# Patient Record
Sex: MALE | Race: WHITE | Employment: OTHER | URBAN - METROPOLITAN AREA
[De-identification: names, ages, dates, MRNs, and addresses within clinical notes are randomized per-mention and may not be internally consistent; named-entity substitution may affect disease eponyms.]

---

## 2018-10-11 ENCOUNTER — HOSPITAL ENCOUNTER (OUTPATIENT)
Age: 83
Discharge: HOME | End: 2018-10-11
Payer: MEDICARE

## 2018-10-11 LAB
ALBUMIN SERPL-MCNC: 4.1 G/DL (ref 3.5–5.2)
ALP BLD-CCNC: 114 U/L (ref 40–129)
ALT SERPL-CCNC: 9 U/L (ref 0–40)
ANION GAP SERPL CALCULATED.3IONS-SCNC: 10 MMOL/L (ref 7–16)
AST SERPL-CCNC: 21 U/L (ref 0–39)
BILIRUB SERPL-MCNC: 0.6 MG/DL (ref 0–1.2)
BUN BLDV-MCNC: 18 MG/DL (ref 8–23)
CALCIUM SERPL-MCNC: 9.1 MG/DL (ref 8.6–10.2)
CHLORIDE BLD-SCNC: 104 MMOL/L (ref 98–107)
CHOLESTEROL, FASTING: 141 MG/DL (ref 0–199)
CO2: 28 MMOL/L (ref 22–29)
CREAT SERPL-MCNC: 1.4 MG/DL (ref 0.7–1.2)
GFR AFRICAN AMERICAN: 59
GFR NON-AFRICAN AMERICAN: 49 ML/MIN/1.73
GLUCOSE FASTING: 93 MG/DL (ref 74–109)
HCT VFR BLD CALC: 38.3 % (ref 37–54)
HDLC SERPL-MCNC: 52 MG/DL
HEMOGLOBIN: 12.7 G/DL (ref 12.5–16.5)
LDL CHOLESTEROL CALCULATED: 73 MG/DL (ref 0–99)
MCH RBC QN AUTO: 30.7 PG (ref 26–35)
MCHC RBC AUTO-ENTMCNC: 33.2 % (ref 32–34.5)
MCV RBC AUTO: 92.5 FL (ref 80–99.9)
PDW BLD-RTO: 13.8 FL (ref 11.5–15)
PLATELET # BLD: 154 E9/L (ref 130–450)
PMV BLD AUTO: 13 FL (ref 7–12)
POTASSIUM SERPL-SCNC: 4.5 MMOL/L (ref 3.5–5)
PROSTATE SPECIFIC ANTIGEN: 1.36 NG/ML (ref 0–4)
RBC # BLD: 4.14 E12/L (ref 3.8–5.8)
SODIUM BLD-SCNC: 142 MMOL/L (ref 132–146)
TOTAL CK: 58 U/L (ref 20–200)
TOTAL PROTEIN: 7.1 G/DL (ref 6.4–8.3)
TRIGLYCERIDE, FASTING: 81 MG/DL (ref 0–149)
TSH SERPL DL<=0.05 MIU/L-ACNC: 1.3 UIU/ML (ref 0.27–4.2)
VITAMIN D 25-HYDROXY: 29 NG/ML (ref 30–100)
VLDLC SERPL CALC-MCNC: 16 MG/DL
WBC # BLD: 6 E9/L (ref 4.5–11.5)

## 2018-10-11 PROCEDURE — 84443 ASSAY THYROID STIM HORMONE: CPT

## 2018-10-11 PROCEDURE — 85027 COMPLETE CBC AUTOMATED: CPT

## 2018-10-11 PROCEDURE — 80061 LIPID PANEL: CPT

## 2018-10-11 PROCEDURE — 82306 VITAMIN D 25 HYDROXY: CPT

## 2018-10-11 PROCEDURE — 80053 COMPREHEN METABOLIC PANEL: CPT

## 2018-10-11 PROCEDURE — 36415 COLL VENOUS BLD VENIPUNCTURE: CPT

## 2018-10-11 PROCEDURE — 82550 ASSAY OF CK (CPK): CPT

## 2018-10-11 PROCEDURE — G0103 PSA SCREENING: HCPCS

## 2019-05-30 DIAGNOSIS — M25.551 RIGHT HIP PAIN: Primary | ICD-10-CM

## 2019-05-31 ENCOUNTER — OFFICE VISIT (OUTPATIENT)
Dept: ORTHOPEDIC SURGERY | Age: 84
End: 2019-05-31
Payer: MEDICARE

## 2019-05-31 VITALS — BODY MASS INDEX: 24.52 KG/M2 | HEIGHT: 73 IN | WEIGHT: 185 LBS

## 2019-05-31 DIAGNOSIS — M16.11 PRIMARY OSTEOARTHRITIS OF RIGHT HIP: Primary | ICD-10-CM

## 2019-05-31 PROCEDURE — 99203 OFFICE O/P NEW LOW 30 MIN: CPT | Performed by: ORTHOPAEDIC SURGERY

## 2019-05-31 RX ORDER — BICALUTAMIDE 50 MG/1
TABLET, FILM COATED ORAL
Refills: 5 | COMMUNITY
Start: 2019-04-03 | End: 2021-01-01 | Stop reason: ALTCHOICE

## 2019-05-31 RX ORDER — SOTALOL HYDROCHLORIDE 80 MG/1
TABLET ORAL
Refills: 3 | COMMUNITY
Start: 2019-05-16 | End: 2021-01-01 | Stop reason: ALTCHOICE

## 2019-05-31 RX ORDER — ALPRAZOLAM 0.5 MG/1
TABLET ORAL
Refills: 0 | COMMUNITY
Start: 2019-04-23 | End: 2021-01-01 | Stop reason: ALTCHOICE

## 2019-05-31 RX ORDER — APIXABAN 5 MG/1
TABLET, FILM COATED ORAL
Refills: 3 | COMMUNITY
Start: 2019-05-12 | End: 2021-01-01 | Stop reason: ALTCHOICE

## 2019-05-31 RX ORDER — SIMVASTATIN 20 MG
20 TABLET ORAL NIGHTLY
COMMUNITY
End: 2021-01-01 | Stop reason: ALTCHOICE

## 2019-05-31 RX ORDER — CLOTRIMAZOLE AND BETAMETHASONE DIPROPIONATE 10; .64 MG/G; MG/G
CREAM TOPICAL 2 TIMES DAILY
COMMUNITY
End: 2021-01-01 | Stop reason: ALTCHOICE

## 2019-05-31 NOTE — PROGRESS NOTES
Juliane Guzman is a 82 y.o. male, who presents   Chief Complaint   Patient presents with    New Patient     new patient for right hip pain, patient states his pain is groin area. HPI[de-identified] Right hip pains been present for bearing length of time depending on what he has to patient agrees wife. It's been at least several weeks. He's had some buttock pain and also some groin pain. It apparently has lessened here lately. There has been some alteration in gait noted despite his wife. It is no history of instability. Additional medical history is significant for prostate cancer this been known for about 4 years. He is under the care of Dr. Kike Edward and has had seed placement and radiation treatments. PSA was apparently low for some time but has recently elevated and he has been referred to the [facility] for further evaluation and treatment. Additional treatment has involved Lupron injections on a serial basis. Allergies; medications; past medical, surgical, family, and social history; and problem list have been reviewed today and updated as indicated in this encounter - see below following Ortho specifics. Musculoskeletal: Stance is grossly erect. There is a slight limp favoring the right lower extremity. Leg stance is good on both sides. In condition gross neurovascular function is good in both lower extremities. Knee range of motion is good with no pain and no instability. There are no signs of other issues in the knees. The left hip range of motion is very good in flexion and extension abduction and abduction and rotation. The right hip has pretty good flexion and extension but some limitation of abduction and distinctly limited internal rotation with some pain associated. Radiologic Studies: Imaging in 2 views of the right hip shows narrowing of the hip joint space. The contours are good with no evidence of acetabular erosion or femoral head collapse.  There are hypertrophic marginal changes on the head and the acetabulum. There are also some density changes in the bone adjacent to the acetabulum and in the pubic area and surgery report by radiologist. Metal markers are present in the prostate as well    ASSESSMENT:  Madison Chapman was seen today for new patient. Diagnoses and all orders for this visit:    Primary osteoarthritis of right hip     Treatment alternatives were reviewed including medical and physical therapies, injections, and surgical options, expected risks benefits and likely outcome of each were discussed in detail, questions asked and answered and understood. Conservative treatment for the hip pain is available in the form of intra-articular injection with image guidance. This the offered if his hip pain increases. This would also delay any considerations for surgical treatment of the hip until the prostate issues are dealt with appropriately. PLAN: Observation at this time. If symptoms get worse we would offer intra-articular injection. He will tell the doctors and staff at the [facility] about his x-rays here at [address] today so any issues can be included in his treatment for prostate disease. There is no problem list on file for this patient.       Past Medical History:   Diagnosis Date    Anxiety     High cholesterol     Prostate CA [facility])        Past Surgical History:   Procedure Laterality Date    CATARACT REMOVAL WITH IMPLANT      both eyes    COLONOSCOPY      KNEE ARTHROSCOPY      right knee    PROSTATE BIOPSY      TONSILLECTOMY         Current Outpatient Medications   Medication Sig Dispense Refill    sotalol (BETAPACE) 80 MG tablet TAKE 1 TABLET BY MOUTH TWICE A DAY  3    ELIQUIS 5 MG TABS tablet TAKE 1 TABLET TWICE A DAY BY MOUTH  3    bicalutamide (CASODEX) 50 MG chemo tablet TAKE 1 TABLET BY MOUTH EVERY DAY  5    ALPRAZolam (XANAX) 0.5 MG tablet TAKE 1 TABLET BY MOUTH EVERY DAY  0    simvastatin (ZOCOR) 20 MG tablet Take 20 mg by mouth nightly      clotrimazole-betamethasone (LOTRISONE) 1-0.05 % cream Apply topically 2 times daily Apply topically 2 times daily. No current facility-administered medications for this visit. Allergies   Allergen Reactions    Amoxicillin Other (See Comments)       Social History     Socioeconomic History    Marital status:      Spouse name: None    Number of children: None    Years of education: None    Highest education level: None   Occupational History    None   Social Needs    Financial resource strain: None    Food insecurity:     Worry: None     Inability: None    Transportation needs:     Medical: None     Non-medical: None   Tobacco Use    Smoking status: Former Smoker    Smokeless tobacco: Current User     Types: Chew   Substance and Sexual Activity    Alcohol use: Not Currently    Drug use: Never    Sexual activity: Not Currently   Lifestyle    Physical activity:     Days per week: None     Minutes per session: None    Stress: None   Relationships    Social connections:     Talks on phone: None     Gets together: None     Attends Gnosticism service: None     Active member of club or organization: None     Attends meetings of clubs or organizations: None     Relationship status: None    Intimate partner violence:     Fear of current or ex partner: None     Emotionally abused: None     Physically abused: None     Forced sexual activity: None   Other Topics Concern    None   Social History Narrative    None       Family History   Problem Relation Age of Onset    Cancer Mother     Cancer Father     Cancer Brother          Review of Systems:   As follows except as previously noted in HPI:  Constitutional: Negative for chills, diaphoresis,  fever   Respiratory: Negative for cough, shortness of breath and wheezing. Cardiovascular: Negative for chest pain and palpitations.    Neurological: Negative for dizziness, syncope,   GI / : abdominal pain or cramping  Musculoskeletal: see HPI Objective:   Physical Exam   Constitutional: Oriented to person, place, and time. and appears well-developed and well-nourished. :   Head: Normocephalic and atraumatic. Neck: Neck supple. Eyes: EOM are normal.   Pulmonary/Chest: Effort normal.  No respiratory distress, no wheezes. Neurological: Alert and oriented to person  Skin: Skin is warm and dry. Chilo Olmedo DO    5/31/19  9:19 AM    All reasonable efforts have been made to minimize the risk of errors that may occur in the use of voice recognition and other electronic means of charting.

## 2019-06-13 ENCOUNTER — HOSPITAL ENCOUNTER (OUTPATIENT)
Dept: NUCLEAR MEDICINE | Age: 84
Discharge: HOME | End: 2019-06-13
Payer: MEDICARE

## 2019-06-13 ENCOUNTER — HOSPITAL ENCOUNTER (OUTPATIENT)
Dept: CT IMAGING | Age: 84
Discharge: HOME | End: 2019-06-13
Payer: MEDICARE

## 2019-06-13 DIAGNOSIS — C61 PROSTATE CANCER (HCC): ICD-10-CM

## 2019-06-13 PROCEDURE — A9503 TC99M MEDRONATE: HCPCS | Performed by: RADIOLOGY

## 2019-06-13 PROCEDURE — 71260 CT THORAX DX C+: CPT

## 2019-06-13 PROCEDURE — 3430000000 HC RX DIAGNOSTIC RADIOPHARMACEUTICAL: Performed by: RADIOLOGY

## 2019-06-13 PROCEDURE — 78306 BONE IMAGING WHOLE BODY: CPT

## 2019-06-13 PROCEDURE — 74177 CT ABD & PELVIS W/CONTRAST: CPT

## 2019-06-13 PROCEDURE — 6360000004 HC RX CONTRAST MEDICATION: Performed by: RADIOLOGY

## 2019-06-13 RX ORDER — TC 99M MEDRONATE 20 MG/10ML
25 INJECTION, POWDER, LYOPHILIZED, FOR SOLUTION INTRAVENOUS
Status: COMPLETED | OUTPATIENT
Start: 2019-06-13 | End: 2019-06-13

## 2019-06-13 RX ADMIN — IOHEXOL 50 ML: 240 INJECTION, SOLUTION INTRATHECAL; INTRAVASCULAR; INTRAVENOUS; ORAL at 10:05

## 2019-06-13 RX ADMIN — IOPAMIDOL 80 ML: 755 INJECTION, SOLUTION INTRAVENOUS at 10:05

## 2019-06-13 RX ADMIN — TC 99M MEDRONATE 25 MILLICURIE: 20 INJECTION, POWDER, LYOPHILIZED, FOR SOLUTION INTRAVENOUS at 07:53

## 2019-07-10 ENCOUNTER — HOSPITAL ENCOUNTER (OUTPATIENT)
Dept: NUCLEAR MEDICINE | Age: 84
Discharge: HOME | End: 2019-07-10
Payer: MEDICARE

## 2019-07-10 DIAGNOSIS — C61 MALIGNANT NEOPLASM OF PROSTATE (HCC): ICD-10-CM

## 2019-07-10 PROCEDURE — A9552 F18 FDG: HCPCS | Performed by: RADIOLOGY

## 2019-07-10 PROCEDURE — 78815 PET IMAGE W/CT SKULL-THIGH: CPT

## 2019-07-10 PROCEDURE — 3430000000 HC RX DIAGNOSTIC RADIOPHARMACEUTICAL: Performed by: RADIOLOGY

## 2019-07-10 RX ORDER — FLUDEOXYGLUCOSE F 18 200 MCI/ML
10 INJECTION, SOLUTION INTRAVENOUS
Status: COMPLETED | OUTPATIENT
Start: 2019-07-10 | End: 2019-07-10

## 2019-07-10 RX ADMIN — FLUDEOXYGLUCOSE F 18 10 MILLICURIE: 200 INJECTION, SOLUTION INTRAVENOUS at 13:53

## 2020-01-01 ENCOUNTER — OFFICE VISIT (OUTPATIENT)
Dept: PRIMARY CARE CLINIC | Age: 85
End: 2020-01-01
Payer: MEDICARE

## 2020-01-01 VITALS
HEART RATE: 61 BPM | SYSTOLIC BLOOD PRESSURE: 120 MMHG | HEIGHT: 74 IN | BODY MASS INDEX: 23.74 KG/M2 | TEMPERATURE: 97.6 F | WEIGHT: 185 LBS | DIASTOLIC BLOOD PRESSURE: 62 MMHG | OXYGEN SATURATION: 96 %

## 2020-01-01 LAB
Lab: ABNORMAL
QC PASS/FAIL: ABNORMAL
SARS-COV-2, POC: DETECTED

## 2020-01-01 PROCEDURE — 99213 OFFICE O/P EST LOW 20 MIN: CPT | Performed by: NURSE PRACTITIONER

## 2020-01-01 PROCEDURE — 87426 SARSCOV CORONAVIRUS AG IA: CPT | Performed by: NURSE PRACTITIONER

## 2020-01-01 RX ORDER — PREDNISONE 1 MG/1
5 TABLET ORAL DAILY
Status: ON HOLD | COMMUNITY
Start: 2021-01-01 | End: 2021-01-01 | Stop reason: HOSPADM

## 2020-01-01 RX ORDER — ATORVASTATIN CALCIUM 10 MG/1
10 TABLET, FILM COATED ORAL NIGHTLY
Status: ON HOLD | COMMUNITY
End: 2021-01-01 | Stop reason: HOSPADM

## 2020-01-01 RX ORDER — ZINC SULFATE 50(220)MG
50 CAPSULE ORAL DAILY
Qty: 7 CAPSULE | Refills: 0 | COMMUNITY
Start: 2020-01-01 | End: 2021-01-01 | Stop reason: ALTCHOICE

## 2020-01-01 RX ORDER — ASCORBIC ACID 500 MG
500 TABLET ORAL 2 TIMES DAILY
Qty: 14 TABLET | Refills: 0 | Status: SHIPPED
Start: 2020-01-01 | End: 2021-01-01 | Stop reason: ALTCHOICE

## 2020-01-01 RX ORDER — DOXYCYCLINE HYCLATE 100 MG
100 TABLET ORAL 2 TIMES DAILY
Qty: 20 TABLET | Refills: 0 | Status: SHIPPED | OUTPATIENT
Start: 2020-01-01 | End: 2020-01-01

## 2020-01-01 RX ORDER — SOTALOL HYDROCHLORIDE 120 MG/1
120 TABLET ORAL 2 TIMES DAILY
COMMUNITY
End: 2021-01-01 | Stop reason: ALTCHOICE

## 2020-01-01 RX ORDER — ABIRATERONE ACETATE 250 MG/1
250 TABLET ORAL
COMMUNITY
End: 2021-01-01 | Stop reason: ALTCHOICE

## 2020-01-01 RX ORDER — DILTIAZEM HYDROCHLORIDE 180 MG/1
180 CAPSULE, EXTENDED RELEASE ORAL DAILY
COMMUNITY
End: 2021-01-01 | Stop reason: ALTCHOICE

## 2020-01-01 RX ORDER — BENZONATATE 100 MG/1
100 CAPSULE ORAL 3 TIMES DAILY PRN
Qty: 21 CAPSULE | Refills: 0 | Status: SHIPPED | OUTPATIENT
Start: 2020-01-01 | End: 2020-01-01

## 2020-01-09 ENCOUNTER — HOSPITAL ENCOUNTER (OUTPATIENT)
Dept: CT IMAGING | Age: 85
Discharge: HOME | End: 2020-01-09
Payer: MEDICARE

## 2020-01-09 PROCEDURE — 71260 CT THORAX DX C+: CPT

## 2020-01-09 PROCEDURE — 6360000004 HC RX CONTRAST MEDICATION: Performed by: RADIOLOGY

## 2020-01-09 RX ADMIN — IOPAMIDOL 80 ML: 755 INJECTION, SOLUTION INTRAVENOUS at 15:38

## 2020-08-18 ENCOUNTER — HOSPITAL ENCOUNTER (OUTPATIENT)
Age: 85
Discharge: HOME | End: 2020-08-18
Payer: MEDICARE

## 2020-08-18 LAB — PROSTATE SPECIFIC ANTIGEN: 2.17 NG/ML (ref 0–4)

## 2020-08-18 PROCEDURE — 36415 COLL VENOUS BLD VENIPUNCTURE: CPT

## 2020-08-18 PROCEDURE — 84153 ASSAY OF PSA TOTAL: CPT

## 2020-08-18 PROCEDURE — G0103 PSA SCREENING: HCPCS

## 2020-12-07 NOTE — PROGRESS NOTES
Chief Complaint   Cough (x1 week ); Chest Congestion; and Nasal Congestion      History of Present Illness   Source of history provided by:  patientSumaya Poole is a 82 y.o. old male who has a past medical history of:   Past Medical History:   Diagnosis Date    Anxiety     High cholesterol     Prostate CA ([address]     Presents to the flu clinic with complaints of Generalized Body Aches, Nasal Congestion, productive Cough and Chest congestion x 6 days. States symptoms have stayed the same since onset. Has been taking Acetaminophen without symptomatic relief. Denies any Fever, Shortness of breath, Nausea or Vomiting. Denies any hx of no history of pneumonia or bronchitis. ROS   Pertinent positives and negatives are stated within HPI, all other systems reviewed and are negative. Surgical History:  has a past surgical history that includes Colonoscopy; Prostate Biopsy; Tonsillectomy; Cataract removal with implant; and Knee arthroscopy. Social History:  reports that he has quit smoking. His smokeless tobacco use includes chew. He reports previous alcohol use. He reports that he does not use drugs. Family History: family history includes Cancer in his brother, father, and mother. Allergies: Amoxicillin    Physical Exam      VS:  /62   Pulse 61   Temp 97.6 °F (36.4 °C) (Temporal)   Ht 6' 2\" (1.88 m)   Wt 185 lb (83.9 kg)   SpO2 96%   BMI 23.75 kg/m²    Oxygen Saturation Interpretation: Normal.    Constitutional:  Alert, development consistent with age. NAD. Head:  NC/NT. Airway patent. Ears: TMs wnl bilaterally. Canals without exudate or swelling bilaterally. Mouth: Posterior pharynx with mild erythema and clear postnasal drip. no tonsillar hypertrophy or exudate. Neck:  Normal ROM. Supple. no anterior cervical adenopathy noted. Lungs: CTAB without wheezes, rales, or rhonchi.    CV:  Regular rate and rhythm, normal heart sounds, without pathological murmurs, ectopy, gallops, or rubs.  Skin:  Normal turgor. Warm, dry, without visible rash. Lymphatic: No lymphangitis or adenopathy noted. Neurological:  Oriented. Motor functions intact. Lab / Imaging Results   (All laboratory and radiology results have been personally reviewed by myself)  Labs:  Results for orders placed or performed in visit on 12/07/20   POCT COVID-19, Antigen   Result Value Ref Range    SARS-COV-2, POC Detected Not Detected    Lot Number [id number]     QC Pass/Fail pass        Imaging: All Radiology results interpreted by Radiologist unless otherwise noted. No results found. Medical Decision Making   Pt non-toxic, in no apparent distress and stable at time of discharge. Assessment/Plan   Kayla Chu was seen today for cough, chest congestion and nasal congestion. Diagnoses and all orders for this visit:    Flu-like symptoms  -     zinc sulfate (ZINCATE) 220 (50 Zn) MG capsule; Take 1 capsule by mouth daily for 7 days  -     vitamin C (ASCORBIC ACID) 500 MG tablet; Take 1 tablet by mouth 2 times daily for 7 days  -     POCT COVID-19, Antigen    Other orders  -     doxycycline hyclate (VIBRA-TABS) 100 MG tablet; Take 1 tablet by mouth 2 times daily for 10 days  -     benzonatate (TESSALON PERLES) 100 MG capsule; Take 1 capsule by mouth 3 times daily as needed for Cough        COVID-19 swab obtained and pending, will call with results once available. Advised strict self-quarantine at home in the interim. Work excuse provided to patient today. Increase fluids and rest. Symptomatic relief discussed including Tylenol prn pain/fever. Schedule f/u with PCP in 7-10 days if symptoms persist. ED sooner if symptoms worsen or change. ED immediately with high or refractory fever, progressive SOB, dyspnea, CP, calf pain/swelling, shaking chills, vomiting, abdominal pain, lethargy, flank pain, or decreased urinary output. Pt verbalizes understanding and is in agreement with plan of care. All questions answered.     Ramses Felix Murtaza Ramírez CNP    This visit was provided as a focused evaluation during the COVID -19 pandemic/national emergency. A comprehensive review of all previous patient history and testing was not conducted. Pertinent findings were elicited during the visit.

## 2021-01-01 ENCOUNTER — APPOINTMENT (OUTPATIENT)
Dept: GENERAL RADIOLOGY | Age: 86
DRG: 698 | End: 2021-01-01
Payer: MEDICARE

## 2021-01-01 ENCOUNTER — HOSPITAL ENCOUNTER (INPATIENT)
Age: 86
LOS: 2 days | Discharge: HOSPICE | DRG: 698 | End: 2021-08-26
Attending: EMERGENCY MEDICINE | Admitting: INTERNAL MEDICINE
Payer: MEDICARE

## 2021-01-01 ENCOUNTER — APPOINTMENT (OUTPATIENT)
Dept: ULTRASOUND IMAGING | Age: 86
DRG: 698 | End: 2021-01-01
Payer: MEDICARE

## 2021-01-01 ENCOUNTER — HOSPITAL ENCOUNTER (OUTPATIENT)
Age: 86
Discharge: HOME | End: 2021-02-22
Payer: MEDICARE

## 2021-01-01 ENCOUNTER — APPOINTMENT (OUTPATIENT)
Dept: CT IMAGING | Age: 86
DRG: 698 | End: 2021-01-01
Payer: MEDICARE

## 2021-01-01 ENCOUNTER — HOSPITAL ENCOUNTER (OUTPATIENT)
Age: 86
Discharge: HOME | End: 2021-03-30
Payer: MEDICARE

## 2021-01-01 VITALS
HEIGHT: 74 IN | RESPIRATION RATE: 22 BRPM | DIASTOLIC BLOOD PRESSURE: 110 MMHG | BODY MASS INDEX: 20.15 KG/M2 | TEMPERATURE: 98.9 F | HEART RATE: 83 BPM | OXYGEN SATURATION: 93 % | WEIGHT: 157 LBS | SYSTOLIC BLOOD PRESSURE: 153 MMHG

## 2021-01-01 DIAGNOSIS — N17.9 AKI (ACUTE KIDNEY INJURY) (HCC): ICD-10-CM

## 2021-01-01 DIAGNOSIS — N39.0 UTI (URINARY TRACT INFECTION), BACTERIAL: ICD-10-CM

## 2021-01-01 DIAGNOSIS — A49.9 UTI (URINARY TRACT INFECTION), BACTERIAL: ICD-10-CM

## 2021-01-01 DIAGNOSIS — A41.9 SEPSIS, DUE TO UNSPECIFIED ORGANISM, UNSPECIFIED WHETHER ACUTE ORGAN DYSFUNCTION PRESENT (HCC): Primary | ICD-10-CM

## 2021-01-01 DIAGNOSIS — R41.82 ALTERED MENTAL STATUS, UNSPECIFIED ALTERED MENTAL STATUS TYPE: ICD-10-CM

## 2021-01-01 DIAGNOSIS — E87.5 HYPERKALEMIA: ICD-10-CM

## 2021-01-01 LAB
ABO/RH: NORMAL
ADENOVIRUS BY PCR: NOT DETECTED
ALBUMIN SERPL-MCNC: 2.1 G/DL (ref 3.5–5.2)
ALBUMIN SERPL-MCNC: 2.2 G/DL (ref 3.5–5.2)
ALBUMIN SERPL-MCNC: 2.9 G/DL (ref 3.5–5.2)
ALP BLD-CCNC: 582 U/L (ref 40–129)
ALP BLD-CCNC: 666 U/L (ref 40–129)
ALP BLD-CCNC: 730 U/L (ref 40–129)
ALT SERPL-CCNC: 15 U/L (ref 0–40)
ALT SERPL-CCNC: 15 U/L (ref 0–40)
ALT SERPL-CCNC: 18 U/L (ref 0–40)
AMMONIA: 10 UMOL/L (ref 16–60)
ANION GAP SERPL CALCULATED.3IONS-SCNC: 12 MMOL/L (ref 7–16)
ANION GAP SERPL CALCULATED.3IONS-SCNC: 14 MMOL/L (ref 7–16)
ANION GAP SERPL CALCULATED.3IONS-SCNC: 15 MMOL/L (ref 7–16)
ANION GAP SERPL CALCULATED.3IONS-SCNC: 16 MMOL/L (ref 7–16)
ANISOCYTOSIS: ABNORMAL
ANTIBODY SCREEN: NORMAL
AST SERPL-CCNC: 65 U/L (ref 0–39)
AST SERPL-CCNC: 69 U/L (ref 0–39)
AST SERPL-CCNC: 95 U/L (ref 0–39)
B.E.: -7.5 MMOL/L (ref -3–3)
BACTERIA: ABNORMAL /HPF
BASOPHILS ABSOLUTE: 0 E9/L (ref 0–0.2)
BASOPHILS RELATIVE PERCENT: 0 % (ref 0–2)
BASOPHILS RELATIVE PERCENT: 0 % (ref 0–2)
BASOPHILS RELATIVE PERCENT: 0.1 % (ref 0–2)
BILIRUB SERPL-MCNC: 0.4 MG/DL (ref 0–1.2)
BILIRUB SERPL-MCNC: 0.5 MG/DL (ref 0–1.2)
BILIRUB SERPL-MCNC: 0.5 MG/DL (ref 0–1.2)
BILIRUBIN DIRECT: 0.3 MG/DL (ref 0–0.3)
BILIRUBIN DIRECT: <0.2 MG/DL (ref 0–0.3)
BILIRUBIN URINE: ABNORMAL
BILIRUBIN, INDIRECT: 0.2 MG/DL (ref 0–1)
BILIRUBIN, INDIRECT: ABNORMAL MG/DL (ref 0–1)
BLOOD BANK DISPENSE STATUS: NORMAL
BLOOD BANK PRODUCT CODE: NORMAL
BLOOD, URINE: ABNORMAL
BORDETELLA PARAPERTUSSIS BY PCR: NOT DETECTED
BORDETELLA PERTUSSIS BY PCR: NOT DETECTED
BPU ID: NORMAL
BUN BLDV-MCNC: 16 MG/DL (ref 8–23)
BUN BLDV-MCNC: 32 MG/DL (ref 6–23)
BUN BLDV-MCNC: 33 MG/DL (ref 6–23)
BUN BLDV-MCNC: 35 MG/DL (ref 6–23)
BUN BLDV-MCNC: 40 MG/DL (ref 6–23)
BURR CELLS: ABNORMAL
CALCIUM IONIZED: 0.74 MMOL/L (ref 1.15–1.33)
CALCIUM IONIZED: 0.79 MMOL/L (ref 1.15–1.33)
CALCIUM SERPL-MCNC: 5 MG/DL (ref 8.6–10.2)
CALCIUM SERPL-MCNC: 5.2 MG/DL (ref 8.6–10.2)
CALCIUM SERPL-MCNC: 5.3 MG/DL (ref 8.6–10.2)
CALCIUM SERPL-MCNC: 5.9 MG/DL (ref 8.6–10.2)
CHLAMYDOPHILIA PNEUMONIAE BY PCR: NOT DETECTED
CHLORIDE BLD-SCNC: 110 MMOL/L (ref 98–107)
CHLORIDE BLD-SCNC: 110 MMOL/L (ref 98–107)
CHLORIDE BLD-SCNC: 111 MMOL/L (ref 98–107)
CHLORIDE BLD-SCNC: 113 MMOL/L (ref 98–107)
CHOLESTEROL, TOTAL: 69 MG/DL (ref 0–199)
CLARITY: ABNORMAL
CO2: 17 MMOL/L (ref 22–29)
CO2: 18 MMOL/L (ref 22–29)
CO2: 19 MMOL/L (ref 22–29)
CO2: 22 MMOL/L (ref 22–29)
COHB: 0.3 % (ref 0–1.5)
COLOR: YELLOW
CORONAVIRUS 229E BY PCR: NOT DETECTED
CORONAVIRUS HKU1 BY PCR: NOT DETECTED
CORONAVIRUS NL63 BY PCR: NOT DETECTED
CORONAVIRUS OC43 BY PCR: NOT DETECTED
CREAT SERPL-MCNC: 1.1 MG/DL (ref 0.7–1.2)
CREAT SERPL-MCNC: 2.2 MG/DL (ref 0.7–1.2)
CREAT SERPL-MCNC: 2.2 MG/DL (ref 0.7–1.2)
CREAT SERPL-MCNC: 2.3 MG/DL (ref 0.7–1.2)
CREAT SERPL-MCNC: 2.3 MG/DL (ref 0.7–1.2)
CREATININE URINE: 112 MG/DL (ref 40–278)
CRITICAL: ABNORMAL
DATE ANALYZED: ABNORMAL
DATE OF COLLECTION: ABNORMAL
DESCRIPTION BLOOD BANK: NORMAL
DIGOXIN LEVEL: 3.2 NG/ML (ref 0.8–2)
DIGOXIN LEVEL: 3.4 NG/ML (ref 0.8–2)
DIGOXIN LEVEL: 3.9 NG/ML (ref 0.8–2)
EKG ATRIAL RATE: 97 BPM
EKG Q-T INTERVAL: 298 MS
EKG QRS DURATION: 100 MS
EKG QTC CALCULATION (BAZETT): 430 MS
EKG R AXIS: -54 DEGREES
EKG T AXIS: -61 DEGREES
EKG VENTRICULAR RATE: 125 BPM
EOSINOPHIL, URINE: 2 % (ref 0–1)
EOSINOPHILS ABSOLUTE: 0.07 E9/L (ref 0.05–0.5)
EOSINOPHILS ABSOLUTE: 0.08 E9/L (ref 0.05–0.5)
EOSINOPHILS ABSOLUTE: 0.32 E9/L (ref 0.05–0.5)
EOSINOPHILS RELATIVE PERCENT: 0.9 % (ref 0–6)
EOSINOPHILS RELATIVE PERCENT: 1 % (ref 0–6)
EOSINOPHILS RELATIVE PERCENT: 3 % (ref 0–6)
EPITHELIAL CELLS, UA: ABNORMAL /HPF
FERRITIN: 8601 NG/ML
FERRITIN: 9012 NG/ML
FOLATE: 11 NG/ML (ref 4.8–24.2)
GFR AFRICAN AMERICAN: 33
GFR AFRICAN AMERICAN: 33
GFR AFRICAN AMERICAN: 35
GFR AFRICAN AMERICAN: 35
GFR AFRICAN AMERICAN: >60
GFR NON-AFRICAN AMERICAN: 27 ML/MIN/1.73
GFR NON-AFRICAN AMERICAN: 27 ML/MIN/1.73
GFR NON-AFRICAN AMERICAN: 29 ML/MIN/1.73
GFR NON-AFRICAN AMERICAN: 29 ML/MIN/1.73
GFR NON-AFRICAN AMERICAN: >60 ML/MIN/1.73
GLUCOSE BLD-MCNC: 102 MG/DL (ref 74–99)
GLUCOSE BLD-MCNC: 88 MG/DL (ref 74–99)
GLUCOSE BLD-MCNC: 93 MG/DL (ref 74–99)
GLUCOSE BLD-MCNC: 94 MG/DL (ref 74–99)
GLUCOSE URINE: NEGATIVE MG/DL
HBA1C MFR BLD: 6 % (ref 4–5.6)
HCO3: 16 MMOL/L (ref 22–26)
HCT VFR BLD CALC: 22 % (ref 37–54)
HCT VFR BLD CALC: 22.8 % (ref 37–54)
HCT VFR BLD CALC: 26.7 % (ref 37–54)
HCT VFR BLD CALC: 26.9 % (ref 37–54)
HDLC SERPL-MCNC: 24 MG/DL
HEMOGLOBIN: 6.8 G/DL (ref 12.5–16.5)
HEMOGLOBIN: 7.2 G/DL (ref 12.5–16.5)
HEMOGLOBIN: 8.3 G/DL (ref 12.5–16.5)
HHB: 8.9 % (ref 0–5)
HUMAN METAPNEUMOVIRUS BY PCR: NOT DETECTED
HUMAN RHINOVIRUS/ENTEROVIRUS BY PCR: NOT DETECTED
HYPOCHROMIA: ABNORMAL
HYPOCHROMIA: ABNORMAL
IMMATURE RETIC FRACT: 30.5 % (ref 2.3–13.4)
INFLUENZA A BY PCR: NOT DETECTED
INFLUENZA B BY PCR: NOT DETECTED
INR BLD: 1.4
IRON SATURATION: 38 % (ref 20–55)
IRON SATURATION: 59 % (ref 20–55)
IRON: 40 MCG/DL (ref 59–158)
IRON: 63 MCG/DL (ref 59–158)
KETONES, URINE: ABNORMAL MG/DL
LAB: ABNORMAL
LACTIC ACID, SEPSIS: 1.9 MMOL/L (ref 0.5–1.9)
LACTIC ACID, SEPSIS: 2.4 MMOL/L (ref 0.5–1.9)
LACTIC ACID, SEPSIS: 3.5 MMOL/L (ref 0.5–1.9)
LACTIC ACID, SEPSIS: 5 MMOL/L (ref 0.5–1.9)
LACTIC ACID: 1.9 MMOL/L (ref 0.5–2.2)
LACTIC ACID: 2.1 MMOL/L (ref 0.5–2.2)
LACTIC ACID: 2.2 MMOL/L (ref 0.5–2.2)
LACTIC ACID: 2.4 MMOL/L (ref 0.5–2.2)
LACTIC ACID: 3.5 MMOL/L (ref 0.5–2.2)
LDL CHOLESTEROL CALCULATED: 28 MG/DL (ref 0–99)
LEUKOCYTE ESTERASE, URINE: ABNORMAL
LIPASE: 28 U/L (ref 13–60)
LV EF: 60 %
LVEF MODALITY: NORMAL
LYMPHOCYTES ABSOLUTE: 0.32 E9/L (ref 1.5–4)
LYMPHOCYTES ABSOLUTE: 0.52 E9/L (ref 1.5–4)
LYMPHOCYTES ABSOLUTE: 0.76 E9/L (ref 1.5–4)
LYMPHOCYTES RELATIVE PERCENT: 10 % (ref 20–42)
LYMPHOCYTES RELATIVE PERCENT: 3 % (ref 20–42)
LYMPHOCYTES RELATIVE PERCENT: 7.2 % (ref 20–42)
Lab: ABNORMAL
MAGNESIUM: 1.7 MG/DL (ref 1.6–2.6)
MAGNESIUM: 1.8 MG/DL (ref 1.6–2.6)
MCH RBC QN AUTO: 28.7 PG (ref 26–35)
MCH RBC QN AUTO: 28.9 PG (ref 26–35)
MCH RBC QN AUTO: 29.1 PG (ref 26–35)
MCHC RBC AUTO-ENTMCNC: 30.9 % (ref 32–34.5)
MCHC RBC AUTO-ENTMCNC: 31.1 % (ref 32–34.5)
MCHC RBC AUTO-ENTMCNC: 31.6 % (ref 32–34.5)
MCV RBC AUTO: 91.6 FL (ref 80–99.9)
MCV RBC AUTO: 92.8 FL (ref 80–99.9)
MCV RBC AUTO: 93.7 FL (ref 80–99.9)
METAMYELOCYTES RELATIVE PERCENT: 0.9 % (ref 0–1)
METAMYELOCYTES RELATIVE PERCENT: 1 % (ref 0–1)
METAMYELOCYTES RELATIVE PERCENT: 2 % (ref 0–1)
METHB: 0.2 % (ref 0–1.5)
MODE: ABNORMAL
MONOCYTES ABSOLUTE: 0 E9/L (ref 0.1–0.95)
MONOCYTES ABSOLUTE: 0.08 E9/L (ref 0.1–0.95)
MONOCYTES ABSOLUTE: 0.64 E9/L (ref 0.1–0.95)
MONOCYTES RELATIVE PERCENT: 1 % (ref 2–12)
MONOCYTES RELATIVE PERCENT: 6 % (ref 2–12)
MONOCYTES RELATIVE PERCENT: 6 % (ref 2–12)
MYCOPLASMA PNEUMONIAE BY PCR: NOT DETECTED
NEUTROPHILS ABSOLUTE: 6.69 E9/L (ref 1.8–7.3)
NEUTROPHILS ABSOLUTE: 6.81 E9/L (ref 1.8–7.3)
NEUTROPHILS ABSOLUTE: 9.33 E9/L (ref 1.8–7.3)
NEUTROPHILS RELATIVE PERCENT: 86 % (ref 43–80)
NEUTROPHILS RELATIVE PERCENT: 87 % (ref 43–80)
NEUTROPHILS RELATIVE PERCENT: 91 % (ref 43–80)
NITRITE, URINE: NEGATIVE
NUCLEATED RED BLOOD CELLS: 2.7 /100 WBC
NUCLEATED RED BLOOD CELLS: 4 /100 WBC
O2 CONTENT: 11.3 ML/DL
O2 SATURATION: 91.1 % (ref 92–98.5)
O2HB: 90.6 % (ref 94–97)
OPERATOR ID: 2420
ORGANISM: ABNORMAL
ORGANISM: ABNORMAL
OVALOCYTES: ABNORMAL
OVALOCYTES: ABNORMAL
PARAINFLUENZA VIRUS 1 BY PCR: NOT DETECTED
PARAINFLUENZA VIRUS 2 BY PCR: NOT DETECTED
PARAINFLUENZA VIRUS 3 BY PCR: NOT DETECTED
PARAINFLUENZA VIRUS 4 BY PCR: NOT DETECTED
PARATHYROID HORMONE INTACT: 409 PG/ML (ref 15–65)
PATIENT TEMP: 37 C
PCO2: 25.8 MMHG (ref 35–45)
PDW BLD-RTO: 21.2 FL (ref 11.5–15)
PDW BLD-RTO: 22.6 FL (ref 11.5–15)
PDW BLD-RTO: 22.9 FL (ref 11.5–15)
PH BLOOD GAS: 7.41 (ref 7.35–7.45)
PH UA: 6 (ref 5–9)
PHOSPHORUS: 3.1 MG/DL (ref 2.5–4.5)
PHOSPHORUS: 3.2 MG/DL (ref 2.5–4.5)
PLATELET # BLD: 139 E9/L (ref 130–450)
PLATELET # BLD: 152 E9/L (ref 130–450)
PLATELET # BLD: 235 E9/L (ref 130–450)
PMV BLD AUTO: 10.3 FL (ref 7–12)
PMV BLD AUTO: 10.4 FL (ref 7–12)
PMV BLD AUTO: 10.9 FL (ref 7–12)
PO2: 65.2 MMHG (ref 75–100)
POIKILOCYTES: ABNORMAL
POLYCHROMASIA: ABNORMAL
POTASSIUM REFLEX MAGNESIUM: 5.9 MMOL/L (ref 3.5–5)
POTASSIUM SERPL-SCNC: 4.5 MMOL/L (ref 3.5–5)
POTASSIUM SERPL-SCNC: 5 MMOL/L (ref 3.5–5)
POTASSIUM SERPL-SCNC: 5.4 MMOL/L (ref 3.5–5)
PROSTATE SPECIFIC ANTIGEN: 3.77 NG/ML (ref 0–4)
PROTEIN UA: NEGATIVE MG/DL
PROTHROMBIN TIME: 16.1 SEC (ref 9.3–12.4)
RBC # BLD: 2.37 E12/L (ref 3.8–5.8)
RBC # BLD: 2.49 E12/L (ref 3.8–5.8)
RBC # BLD: 2.85 E12/L (ref 3.8–5.8)
RBC UA: ABNORMAL /HPF (ref 0–2)
RESPIRATORY SYNCYTIAL VIRUS BY PCR: NOT DETECTED
RETIC HGB EQUIVALENT: 33.9 PG (ref 28.2–36.6)
RETICULOCYTE ABSOLUTE COUNT: 0.04 E12/L
RETICULOCYTE COUNT PCT: 1.4 % (ref 0.4–1.9)
SARS-COV-2, NAAT: NOT DETECTED
SARS-COV-2, PCR: NOT DETECTED
SODIUM BLD-SCNC: 142 MMOL/L (ref 132–146)
SODIUM BLD-SCNC: 144 MMOL/L (ref 132–146)
SODIUM BLD-SCNC: 145 MMOL/L (ref 132–146)
SODIUM BLD-SCNC: 146 MMOL/L (ref 132–146)
SODIUM URINE: 39 MMOL/L
SOURCE, BLOOD GAS: ABNORMAL
SPECIFIC GRAVITY UA: 1.02 (ref 1–1.03)
T4 FREE: 1.37 NG/DL (ref 0.93–1.7)
TEAR DROP CELLS: ABNORMAL
THB: 8.8 G/DL (ref 11.5–16.5)
TIME ANALYZED: 1153
TOTAL CK: 516 U/L (ref 20–200)
TOTAL IRON BINDING CAPACITY: 106 MCG/DL (ref 250–450)
TOTAL IRON BINDING CAPACITY: 107 MCG/DL (ref 250–450)
TOTAL PROTEIN: 4.6 G/DL (ref 6.4–8.3)
TOTAL PROTEIN: 5 G/DL (ref 6.4–8.3)
TOTAL PROTEIN: 6 G/DL (ref 6.4–8.3)
TRIGL SERPL-MCNC: 87 MG/DL (ref 0–149)
TROPONIN, HIGH SENSITIVITY: 73 NG/L (ref 0–11)
TROPONIN, HIGH SENSITIVITY: 77 NG/L (ref 0–11)
TSH SERPL DL<=0.05 MIU/L-ACNC: 2.46 UIU/ML (ref 0.27–4.2)
UREA NITROGEN, UR: 318 MG/DL (ref 800–1666)
URINE CULTURE, ROUTINE: ABNORMAL
URINE CULTURE, ROUTINE: ABNORMAL
UROBILINOGEN, URINE: 0.2 E.U./DL
VANCOMYCIN RANDOM: 11 MCG/ML (ref 5–40)
VANCOMYCIN RANDOM: 18.9 MCG/ML (ref 5–40)
VITAMIN B-12: 990 PG/ML (ref 211–946)
VITAMIN D 25-HYDROXY: 33 NG/ML (ref 30–100)
VLDLC SERPL CALC-MCNC: 17 MG/DL
WBC # BLD: 10.6 E9/L (ref 4.5–11.5)
WBC # BLD: 7.4 E9/L (ref 4.5–11.5)
WBC # BLD: 7.6 E9/L (ref 4.5–11.5)
WBC UA: >20 /HPF (ref 0–5)

## 2021-01-01 PROCEDURE — 82565 ASSAY OF CREATININE: CPT

## 2021-01-01 PROCEDURE — 85025 COMPLETE CBC W/AUTO DIFF WBC: CPT

## 2021-01-01 PROCEDURE — 6360000002 HC RX W HCPCS: Performed by: SPECIALIST

## 2021-01-01 PROCEDURE — 2060000000 HC ICU INTERMEDIATE R&B

## 2021-01-01 PROCEDURE — 2580000003 HC RX 258: Performed by: NURSE PRACTITIONER

## 2021-01-01 PROCEDURE — 6370000000 HC RX 637 (ALT 250 FOR IP): Performed by: NURSE PRACTITIONER

## 2021-01-01 PROCEDURE — 87040 BLOOD CULTURE FOR BACTERIA: CPT

## 2021-01-01 PROCEDURE — 94640 AIRWAY INHALATION TREATMENT: CPT

## 2021-01-01 PROCEDURE — 82550 ASSAY OF CK (CPK): CPT

## 2021-01-01 PROCEDURE — 83550 IRON BINDING TEST: CPT

## 2021-01-01 PROCEDURE — 6360000002 HC RX W HCPCS: Performed by: INTERNAL MEDICINE

## 2021-01-01 PROCEDURE — 84520 ASSAY OF UREA NITROGEN: CPT

## 2021-01-01 PROCEDURE — XW033N5 INTRODUCTION OF MEROPENEM-VABORBACTAM ANTI-INFECTIVE INTO PERIPHERAL VEIN, PERCUTANEOUS APPROACH, NEW TECHNOLOGY GROUP 5: ICD-10-PCS | Performed by: INTERNAL MEDICINE

## 2021-01-01 PROCEDURE — 86901 BLOOD TYPING SEROLOGIC RH(D): CPT

## 2021-01-01 PROCEDURE — 87077 CULTURE AEROBIC IDENTIFY: CPT

## 2021-01-01 PROCEDURE — 80202 ASSAY OF VANCOMYCIN: CPT

## 2021-01-01 PROCEDURE — 6370000000 HC RX 637 (ALT 250 FOR IP): Performed by: INTERNAL MEDICINE

## 2021-01-01 PROCEDURE — 84443 ASSAY THYROID STIM HORMONE: CPT

## 2021-01-01 PROCEDURE — 86850 RBC ANTIBODY SCREEN: CPT

## 2021-01-01 PROCEDURE — 80053 COMPREHEN METABOLIC PANEL: CPT

## 2021-01-01 PROCEDURE — 6360000002 HC RX W HCPCS: Performed by: STUDENT IN AN ORGANIZED HEALTH CARE EDUCATION/TRAINING PROGRAM

## 2021-01-01 PROCEDURE — 2580000003 HC RX 258: Performed by: INTERNAL MEDICINE

## 2021-01-01 PROCEDURE — 84100 ASSAY OF PHOSPHORUS: CPT

## 2021-01-01 PROCEDURE — 2500000003 HC RX 250 WO HCPCS: Performed by: NURSE PRACTITIONER

## 2021-01-01 PROCEDURE — 84153 ASSAY OF PSA TOTAL: CPT

## 2021-01-01 PROCEDURE — 86900 BLOOD TYPING SEROLOGIC ABO: CPT

## 2021-01-01 PROCEDURE — 80061 LIPID PANEL: CPT

## 2021-01-01 PROCEDURE — 82306 VITAMIN D 25 HYDROXY: CPT

## 2021-01-01 PROCEDURE — 36415 COLL VENOUS BLD VENIPUNCTURE: CPT

## 2021-01-01 PROCEDURE — 87088 URINE BACTERIA CULTURE: CPT

## 2021-01-01 PROCEDURE — 87186 SC STD MICRODIL/AGAR DIL: CPT

## 2021-01-01 PROCEDURE — 80048 BASIC METABOLIC PNL TOTAL CA: CPT

## 2021-01-01 PROCEDURE — 85045 AUTOMATED RETICULOCYTE COUNT: CPT

## 2021-01-01 PROCEDURE — 71045 X-RAY EXAM CHEST 1 VIEW: CPT

## 2021-01-01 PROCEDURE — 0202U NFCT DS 22 TRGT SARS-COV-2: CPT

## 2021-01-01 PROCEDURE — 82805 BLOOD GASES W/O2 SATURATION: CPT

## 2021-01-01 PROCEDURE — 82746 ASSAY OF FOLIC ACID SERUM: CPT

## 2021-01-01 PROCEDURE — 92610 EVALUATE SWALLOWING FUNCTION: CPT | Performed by: SPEECH-LANGUAGE PATHOLOGIST

## 2021-01-01 PROCEDURE — 82330 ASSAY OF CALCIUM: CPT

## 2021-01-01 PROCEDURE — 97530 THERAPEUTIC ACTIVITIES: CPT | Performed by: OCCUPATIONAL THERAPIST

## 2021-01-01 PROCEDURE — 85610 PROTHROMBIN TIME: CPT

## 2021-01-01 PROCEDURE — 83605 ASSAY OF LACTIC ACID: CPT

## 2021-01-01 PROCEDURE — 74176 CT ABD & PELVIS W/O CONTRAST: CPT

## 2021-01-01 PROCEDURE — 93005 ELECTROCARDIOGRAM TRACING: CPT | Performed by: STUDENT IN AN ORGANIZED HEALTH CARE EDUCATION/TRAINING PROGRAM

## 2021-01-01 PROCEDURE — 72125 CT NECK SPINE W/O DYE: CPT

## 2021-01-01 PROCEDURE — 6360000002 HC RX W HCPCS: Performed by: NURSE PRACTITIONER

## 2021-01-01 PROCEDURE — 97165 OT EVAL LOW COMPLEX 30 MIN: CPT | Performed by: OCCUPATIONAL THERAPIST

## 2021-01-01 PROCEDURE — 96368 THER/DIAG CONCURRENT INF: CPT

## 2021-01-01 PROCEDURE — 86923 COMPATIBILITY TEST ELECTRIC: CPT

## 2021-01-01 PROCEDURE — 80162 ASSAY OF DIGOXIN TOTAL: CPT

## 2021-01-01 PROCEDURE — 82728 ASSAY OF FERRITIN: CPT

## 2021-01-01 PROCEDURE — 83970 ASSAY OF PARATHORMONE: CPT

## 2021-01-01 PROCEDURE — 83735 ASSAY OF MAGNESIUM: CPT

## 2021-01-01 PROCEDURE — 80076 HEPATIC FUNCTION PANEL: CPT

## 2021-01-01 PROCEDURE — 76705 ECHO EXAM OF ABDOMEN: CPT

## 2021-01-01 PROCEDURE — 82570 ASSAY OF URINE CREATININE: CPT

## 2021-01-01 PROCEDURE — 84540 ASSAY OF URINE/UREA-N: CPT

## 2021-01-01 PROCEDURE — 99284 EMERGENCY DEPT VISIT MOD MDM: CPT

## 2021-01-01 PROCEDURE — 36430 TRANSFUSION BLD/BLD COMPNT: CPT

## 2021-01-01 PROCEDURE — 93306 TTE W/DOPPLER COMPLETE: CPT

## 2021-01-01 PROCEDURE — 84439 ASSAY OF FREE THYROXINE: CPT

## 2021-01-01 PROCEDURE — 2580000003 HC RX 258: Performed by: SPECIALIST

## 2021-01-01 PROCEDURE — 73502 X-RAY EXAM HIP UNI 2-3 VIEWS: CPT

## 2021-01-01 PROCEDURE — 96365 THER/PROPH/DIAG IV INF INIT: CPT

## 2021-01-01 PROCEDURE — 2580000003 HC RX 258: Performed by: STUDENT IN AN ORGANIZED HEALTH CARE EDUCATION/TRAINING PROGRAM

## 2021-01-01 PROCEDURE — 83540 ASSAY OF IRON: CPT

## 2021-01-01 PROCEDURE — 87635 SARS-COV-2 COVID-19 AMP PRB: CPT

## 2021-01-01 PROCEDURE — 99223 1ST HOSP IP/OBS HIGH 75: CPT | Performed by: NURSE PRACTITIONER

## 2021-01-01 PROCEDURE — 82140 ASSAY OF AMMONIA: CPT

## 2021-01-01 PROCEDURE — 6370000000 HC RX 637 (ALT 250 FOR IP): Performed by: SPECIALIST

## 2021-01-01 PROCEDURE — 99231 SBSQ HOSP IP/OBS SF/LOW 25: CPT | Performed by: FAMILY MEDICINE

## 2021-01-01 PROCEDURE — 6360000002 HC RX W HCPCS: Performed by: FAMILY MEDICINE

## 2021-01-01 PROCEDURE — 84300 ASSAY OF URINE SODIUM: CPT

## 2021-01-01 PROCEDURE — 70450 CT HEAD/BRAIN W/O DYE: CPT

## 2021-01-01 PROCEDURE — 83036 HEMOGLOBIN GLYCOSYLATED A1C: CPT

## 2021-01-01 PROCEDURE — 81001 URINALYSIS AUTO W/SCOPE: CPT

## 2021-01-01 PROCEDURE — 82607 VITAMIN B-12: CPT

## 2021-01-01 PROCEDURE — 71250 CT THORAX DX C-: CPT

## 2021-01-01 PROCEDURE — P9016 RBC LEUKOCYTES REDUCED: HCPCS

## 2021-01-01 PROCEDURE — 83690 ASSAY OF LIPASE: CPT

## 2021-01-01 PROCEDURE — 84484 ASSAY OF TROPONIN QUANT: CPT

## 2021-01-01 PROCEDURE — 87205 SMEAR GRAM STAIN: CPT

## 2021-01-01 RX ORDER — MIDODRINE HYDROCHLORIDE 10 MG/1
10 TABLET ORAL
Status: ON HOLD | COMMUNITY
End: 2021-01-01 | Stop reason: HOSPADM

## 2021-01-01 RX ORDER — PANTOPRAZOLE SODIUM 40 MG/1
40 TABLET, DELAYED RELEASE ORAL
Status: ON HOLD | COMMUNITY
End: 2021-01-01 | Stop reason: HOSPADM

## 2021-01-01 RX ORDER — HEPARIN SODIUM 5000 [USP'U]/ML
5000 INJECTION, SOLUTION INTRAVENOUS; SUBCUTANEOUS EVERY 8 HOURS SCHEDULED
Status: DISCONTINUED | OUTPATIENT
Start: 2021-01-01 | End: 2021-01-01 | Stop reason: HOSPADM

## 2021-01-01 RX ORDER — ACETAMINOPHEN 325 MG/1
650 TABLET ORAL EVERY 6 HOURS PRN
Status: DISCONTINUED | OUTPATIENT
Start: 2021-01-01 | End: 2021-01-01

## 2021-01-01 RX ORDER — ACETAMINOPHEN 650 MG/1
650 SUPPOSITORY RECTAL EVERY 4 HOURS PRN
Status: DISCONTINUED | OUTPATIENT
Start: 2021-01-01 | End: 2021-01-01 | Stop reason: HOSPADM

## 2021-01-01 RX ORDER — SODIUM CHLORIDE 0.9 % (FLUSH) 0.9 %
5-40 SYRINGE (ML) INJECTION EVERY 12 HOURS SCHEDULED
Status: DISCONTINUED | OUTPATIENT
Start: 2021-01-01 | End: 2021-01-01 | Stop reason: HOSPADM

## 2021-01-01 RX ORDER — DIGOXIN 250 MCG
250 TABLET ORAL DAILY
Status: ON HOLD | COMMUNITY
End: 2021-01-01 | Stop reason: HOSPADM

## 2021-01-01 RX ORDER — HALOPERIDOL 5 MG/ML
2 INJECTION INTRAMUSCULAR EVERY 6 HOURS PRN
Status: DISCONTINUED | OUTPATIENT
Start: 2021-01-01 | End: 2021-01-01 | Stop reason: HOSPADM

## 2021-01-01 RX ORDER — SODIUM CHLORIDE 9 MG/ML
25 INJECTION, SOLUTION INTRAVENOUS EVERY 12 HOURS
Status: DISCONTINUED | OUTPATIENT
Start: 2021-01-01 | End: 2021-01-01

## 2021-01-01 RX ORDER — 0.9 % SODIUM CHLORIDE 0.9 %
1000 INTRAVENOUS SOLUTION INTRAVENOUS ONCE
Status: COMPLETED | OUTPATIENT
Start: 2021-01-01 | End: 2021-01-01

## 2021-01-01 RX ORDER — ALBUTEROL SULFATE 2.5 MG/3ML
2.5 SOLUTION RESPIRATORY (INHALATION) EVERY 4 HOURS PRN
Status: DISCONTINUED | OUTPATIENT
Start: 2021-01-01 | End: 2021-01-01 | Stop reason: HOSPADM

## 2021-01-01 RX ORDER — ACETAMINOPHEN 650 MG/1
650 SUPPOSITORY RECTAL EVERY 6 HOURS PRN
Status: DISCONTINUED | OUTPATIENT
Start: 2021-01-01 | End: 2021-01-01

## 2021-01-01 RX ORDER — CALCIUM CARBONATE 200(500)MG
4 TABLET,CHEWABLE ORAL
Status: ON HOLD | COMMUNITY
End: 2021-01-01 | Stop reason: HOSPADM

## 2021-01-01 RX ORDER — FERROUS SULFATE 325(65) MG
325 TABLET ORAL
Status: ON HOLD | COMMUNITY
End: 2021-01-01 | Stop reason: HOSPADM

## 2021-01-01 RX ORDER — POTASSIUM CHLORIDE 20 MEQ/1
20 TABLET, EXTENDED RELEASE ORAL DAILY
Status: ON HOLD | COMMUNITY
End: 2021-01-01 | Stop reason: HOSPADM

## 2021-01-01 RX ORDER — METOPROLOL SUCCINATE 100 MG/1
100 TABLET, EXTENDED RELEASE ORAL 2 TIMES DAILY
Status: ON HOLD | COMMUNITY
End: 2021-01-01 | Stop reason: HOSPADM

## 2021-01-01 RX ORDER — LORAZEPAM 2 MG/ML
0.5 INJECTION INTRAMUSCULAR
Status: DISCONTINUED | OUTPATIENT
Start: 2021-01-01 | End: 2021-01-01 | Stop reason: HOSPADM

## 2021-01-01 RX ORDER — AMIODARONE HYDROCHLORIDE 200 MG/1
200 TABLET ORAL 2 TIMES DAILY
Status: ON HOLD | COMMUNITY
End: 2021-01-01 | Stop reason: HOSPADM

## 2021-01-01 RX ORDER — METOPROLOL SUCCINATE 50 MG/1
50 TABLET, EXTENDED RELEASE ORAL 2 TIMES DAILY
Status: ON HOLD | COMMUNITY
End: 2021-01-01 | Stop reason: HOSPADM

## 2021-01-01 RX ORDER — MORPHINE SULFATE 2 MG/ML
2 INJECTION, SOLUTION INTRAMUSCULAR; INTRAVENOUS EVERY 30 MIN PRN
Status: DISCONTINUED | OUTPATIENT
Start: 2021-01-01 | End: 2021-01-01 | Stop reason: HOSPADM

## 2021-01-01 RX ORDER — SODIUM CHLORIDE 0.9 % (FLUSH) 0.9 %
5-40 SYRINGE (ML) INJECTION PRN
Status: DISCONTINUED | OUTPATIENT
Start: 2021-01-01 | End: 2021-01-01 | Stop reason: HOSPADM

## 2021-01-01 RX ORDER — ABIRATERONE 500 MG/1
1000 TABLET ORAL
Status: ON HOLD | COMMUNITY
End: 2021-01-01 | Stop reason: HOSPADM

## 2021-01-01 RX ORDER — GUAIFENESIN 600 MG/1
600 TABLET, EXTENDED RELEASE ORAL 2 TIMES DAILY
Status: ON HOLD | COMMUNITY
Start: 2021-01-01 | End: 2021-01-01 | Stop reason: HOSPADM

## 2021-01-01 RX ORDER — ACYCLOVIR 800 MG/1
800 TABLET ORAL
Status: ON HOLD | COMMUNITY
Start: 2021-01-01 | End: 2021-01-01 | Stop reason: HOSPADM

## 2021-01-01 RX ORDER — MIRTAZAPINE 15 MG/1
15 TABLET, FILM COATED ORAL NIGHTLY
Status: ON HOLD | COMMUNITY
End: 2021-01-01 | Stop reason: HOSPADM

## 2021-01-01 RX ORDER — CALCITRIOL 0.25 UG/1
0.25 CAPSULE, LIQUID FILLED ORAL DAILY
Status: ON HOLD | COMMUNITY
End: 2021-01-01 | Stop reason: HOSPADM

## 2021-01-01 RX ORDER — METOPROLOL SUCCINATE 50 MG/1
50 TABLET, EXTENDED RELEASE ORAL DAILY
Status: DISCONTINUED | OUTPATIENT
Start: 2021-01-01 | End: 2021-01-01 | Stop reason: HOSPADM

## 2021-01-01 RX ORDER — ACETAMINOPHEN 325 MG/1
650 TABLET ORAL EVERY 6 HOURS PRN
Status: DISCONTINUED | OUTPATIENT
Start: 2021-01-01 | End: 2021-01-01 | Stop reason: HOSPADM

## 2021-01-01 RX ORDER — GLYCOPYRROLATE 0.2 MG/ML
0.4 INJECTION INTRAMUSCULAR; INTRAVENOUS EVERY 4 HOURS PRN
Status: DISCONTINUED | OUTPATIENT
Start: 2021-01-01 | End: 2021-01-01 | Stop reason: HOSPADM

## 2021-01-01 RX ORDER — LACTOBACILLUS RHAMNOSUS GG 10B CELL
1 CAPSULE ORAL EVERY 12 HOURS
Status: DISCONTINUED | OUTPATIENT
Start: 2021-01-01 | End: 2021-01-01 | Stop reason: HOSPADM

## 2021-01-01 RX ORDER — MORPHINE SULFATE 4 MG/ML
4 INJECTION, SOLUTION INTRAMUSCULAR; INTRAVENOUS EVERY 30 MIN PRN
Status: DISCONTINUED | OUTPATIENT
Start: 2021-01-01 | End: 2021-01-01 | Stop reason: HOSPADM

## 2021-01-01 RX ORDER — TAMSULOSIN HYDROCHLORIDE 0.4 MG/1
0.4 CAPSULE ORAL NIGHTLY
Status: ON HOLD | COMMUNITY
End: 2021-01-01 | Stop reason: HOSPADM

## 2021-01-01 RX ORDER — ZINC GLUCONATE 50 MG
50 TABLET ORAL DAILY
Status: ON HOLD | COMMUNITY
End: 2021-01-01 | Stop reason: HOSPADM

## 2021-01-01 RX ORDER — ARFORMOTEROL TARTRATE 15 UG/2ML
15 SOLUTION RESPIRATORY (INHALATION) 2 TIMES DAILY
Status: DISCONTINUED | OUTPATIENT
Start: 2021-01-01 | End: 2021-01-01 | Stop reason: HOSPADM

## 2021-01-01 RX ORDER — SODIUM CHLORIDE 9 MG/ML
25 INJECTION, SOLUTION INTRAVENOUS PRN
Status: DISCONTINUED | OUTPATIENT
Start: 2021-01-01 | End: 2021-01-01 | Stop reason: HOSPADM

## 2021-01-01 RX ORDER — METOPROLOL SUCCINATE 25 MG/1
25 TABLET, EXTENDED RELEASE ORAL DAILY
Status: DISCONTINUED | OUTPATIENT
Start: 2021-01-01 | End: 2021-01-01

## 2021-01-01 RX ADMIN — ARFORMOTEROL TARTRATE 15 MCG: 15 SOLUTION RESPIRATORY (INHALATION) at 16:15

## 2021-01-01 RX ADMIN — CALCIUM GLUCONATE 2000 MG: 98 INJECTION, SOLUTION INTRAVENOUS at 01:31

## 2021-01-01 RX ADMIN — SODIUM CHLORIDE, PRESERVATIVE FREE 10 ML: 5 INJECTION INTRAVENOUS at 01:12

## 2021-01-01 RX ADMIN — MORPHINE SULFATE 2 MG: 2 INJECTION, SOLUTION INTRAMUSCULAR; INTRAVENOUS at 17:28

## 2021-01-01 RX ADMIN — SODIUM ZIRCONIUM CYCLOSILICATE 10 G: 10 POWDER, FOR SUSPENSION ORAL at 21:28

## 2021-01-01 RX ADMIN — MEROPENEM 500 MG: 500 INJECTION, POWDER, FOR SOLUTION INTRAVENOUS at 20:33

## 2021-01-01 RX ADMIN — WATER 1000 MG: 1 INJECTION INTRAMUSCULAR; INTRAVENOUS; SUBCUTANEOUS at 13:08

## 2021-01-01 RX ADMIN — SODIUM BICARBONATE: 84 INJECTION, SOLUTION INTRAVENOUS at 20:11

## 2021-01-01 RX ADMIN — CALCIUM GLUCONATE 1000 MG: 98 INJECTION, SOLUTION INTRAVENOUS at 12:53

## 2021-01-01 RX ADMIN — VANCOMYCIN HYDROCHLORIDE 1000 MG: 1 INJECTION, POWDER, LYOPHILIZED, FOR SOLUTION INTRAVENOUS at 16:44

## 2021-01-01 RX ADMIN — SODIUM BICARBONATE: 84 INJECTION, SOLUTION INTRAVENOUS at 07:48

## 2021-01-01 RX ADMIN — HEPARIN SODIUM 5000 UNITS: 5000 INJECTION INTRAVENOUS; SUBCUTANEOUS at 14:49

## 2021-01-01 RX ADMIN — ACETAMINOPHEN 650 MG: 650 SUPPOSITORY RECTAL at 00:42

## 2021-01-01 RX ADMIN — CALCIUM GLUCONATE 2000 MG: 98 INJECTION, SOLUTION INTRAVENOUS at 10:51

## 2021-01-01 RX ADMIN — ACETAMINOPHEN 650 MG: 650 SUPPOSITORY RECTAL at 02:34

## 2021-01-01 RX ADMIN — HEPARIN SODIUM 5000 UNITS: 5000 INJECTION INTRAVENOUS; SUBCUTANEOUS at 20:18

## 2021-01-01 RX ADMIN — HALOPERIDOL LACTATE 2 MG: 5 INJECTION, SOLUTION INTRAMUSCULAR at 17:49

## 2021-01-01 RX ADMIN — Medication 1 CAPSULE: at 09:05

## 2021-01-01 RX ADMIN — METOPROLOL SUCCINATE 25 MG: 25 TABLET, EXTENDED RELEASE ORAL at 09:05

## 2021-01-01 RX ADMIN — HEPARIN SODIUM 5000 UNITS: 5000 INJECTION INTRAVENOUS; SUBCUTANEOUS at 05:21

## 2021-01-01 RX ADMIN — HALOPERIDOL LACTATE 2 MG: 5 INJECTION, SOLUTION INTRAMUSCULAR at 09:24

## 2021-01-01 RX ADMIN — MORPHINE SULFATE 2 MG: 2 INJECTION, SOLUTION INTRAMUSCULAR; INTRAVENOUS at 15:40

## 2021-01-01 RX ADMIN — HALOPERIDOL LACTATE 2 MG: 5 INJECTION, SOLUTION INTRAMUSCULAR at 02:41

## 2021-01-01 RX ADMIN — ACETAMINOPHEN 650 MG: 650 SUPPOSITORY RECTAL at 11:58

## 2021-01-01 RX ADMIN — HEPARIN SODIUM 5000 UNITS: 5000 INJECTION INTRAVENOUS; SUBCUTANEOUS at 06:13

## 2021-01-01 RX ADMIN — HALOPERIDOL LACTATE 2 MG: 5 INJECTION, SOLUTION INTRAMUSCULAR at 20:43

## 2021-01-01 RX ADMIN — SODIUM CHLORIDE, PRESERVATIVE FREE 10 ML: 5 INJECTION INTRAVENOUS at 09:00

## 2021-01-01 RX ADMIN — SODIUM CHLORIDE 1000 ML: 9 INJECTION, SOLUTION INTRAVENOUS at 12:28

## 2021-01-01 RX ADMIN — MEROPENEM 500 MG: 500 INJECTION, POWDER, FOR SOLUTION INTRAVENOUS at 09:05

## 2021-01-01 RX ADMIN — ALBUTEROL SULFATE 2.5 MG: 2.5 SOLUTION RESPIRATORY (INHALATION) at 16:15

## 2021-01-01 RX ADMIN — HEPARIN SODIUM 5000 UNITS: 5000 INJECTION INTRAVENOUS; SUBCUTANEOUS at 21:24

## 2021-01-01 RX ADMIN — MEROPENEM 500 MG: 500 INJECTION, POWDER, FOR SOLUTION INTRAVENOUS at 10:01

## 2021-01-01 RX ADMIN — MORPHINE SULFATE 2 MG: 2 INJECTION, SOLUTION INTRAMUSCULAR; INTRAVENOUS at 16:46

## 2021-01-01 RX ADMIN — SODIUM CHLORIDE 1000 ML: 9 INJECTION, SOLUTION INTRAVENOUS at 11:38

## 2021-01-01 RX ADMIN — MEROPENEM 500 MG: 500 INJECTION, POWDER, FOR SOLUTION INTRAVENOUS at 21:24

## 2021-01-01 RX ADMIN — ACETAMINOPHEN 650 MG: 650 SUPPOSITORY RECTAL at 16:43

## 2021-01-01 RX ADMIN — MORPHINE SULFATE 2 MG: 2 INJECTION, SOLUTION INTRAMUSCULAR; INTRAVENOUS at 14:43

## 2021-01-01 RX ADMIN — SODIUM CHLORIDE, PRESERVATIVE FREE 10 ML: 5 INJECTION INTRAVENOUS at 10:05

## 2021-01-01 RX ADMIN — ARFORMOTEROL TARTRATE 15 MCG: 15 SOLUTION RESPIRATORY (INHALATION) at 16:58

## 2021-01-01 RX ADMIN — SODIUM BICARBONATE: 84 INJECTION, SOLUTION INTRAVENOUS at 06:22

## 2021-01-01 RX ADMIN — Medication 1 CAPSULE: at 21:24

## 2021-01-01 RX ADMIN — CALCIUM GLUCONATE 4000 MG: 98 INJECTION, SOLUTION INTRAVENOUS at 11:05

## 2021-01-01 RX ADMIN — SODIUM CHLORIDE 25 ML: 9 INJECTION, SOLUTION INTRAVENOUS at 10:48

## 2021-01-01 RX ADMIN — VANCOMYCIN HYDROCHLORIDE 1000 MG: 1 INJECTION, POWDER, LYOPHILIZED, FOR SOLUTION INTRAVENOUS at 20:28

## 2021-01-01 RX ADMIN — SODIUM BICARBONATE: 84 INJECTION, SOLUTION INTRAVENOUS at 11:42

## 2021-01-01 ASSESSMENT — PAIN SCALES - GENERAL
PAINLEVEL_OUTOF10: 0
PAINLEVEL_OUTOF10: 0
PAINLEVEL_OUTOF10: 10
PAINLEVEL_OUTOF10: 0
PAINLEVEL_OUTOF10: 10
PAINLEVEL_OUTOF10: 0
PAINLEVEL_OUTOF10: 7
PAINLEVEL_OUTOF10: 10
PAINLEVEL_OUTOF10: 0
PAINLEVEL_OUTOF10: 0
PAINLEVEL_OUTOF10: 10
PAINLEVEL_OUTOF10: 10

## 2021-01-01 ASSESSMENT — ENCOUNTER SYMPTOMS: TACHYPNEA: 1

## 2021-08-24 PROBLEM — A41.9 SEVERE SEPSIS WITH ACUTE ORGAN DYSFUNCTION (HCC): Status: ACTIVE | Noted: 2021-01-01

## 2021-08-24 PROBLEM — R65.20 SEVERE SEPSIS WITH ACUTE ORGAN DYSFUNCTION (HCC): Status: ACTIVE | Noted: 2021-01-01

## 2021-08-24 NOTE — PROGRESS NOTES
Pharmacy Consultation Note  (Antibiotic Dosing and Monitoring)    Initial consult date: 2021   Consulting physician: Elza Ziegler  Drug(s): Vancomycin   Indication: Catheter acquired UTI    Ht Readings from Last 1 Encounters:   21 6' 2\" (1.88 m)     Wt Readings from Last 1 Encounters:   21 149 lb 4 oz (67.7 kg)         Age/  Gender IBW DW  Allergy Information   82 y.o.     male 77.6 kg 67.7 kg  Amoxicillin                 Date  WBC BUN/CR UOP Drug/Dose Time   Given Level(s)   (Time) Comments     (#1) 10.6 32/2.2 -- Vancomycin 1000 mg IV x 1 <1630>       (#2)            (#3)            (#4)            (#5)            (#6)            (#7)            Estimated Creatinine Clearance: 24 mL/min (A) (based on SCr of 2.2 mg/dL (H)). UOP over the past 24 hours:     No intake or output data in the 24 hours ending 21 1509    Temp max: Temp (24hrs), Av.8 °F (36.6 °C), Min:97.8 °F (36.6 °C), Max:97.8 °F (36.6 °C)      Antibiotic Regimen:  Antibiotic Dose Date Initiated   Ceftriaxone 1 g IV x 1    Meropenem 500 mg IV Q12H      Cultures:  available culture and sensitivity results were reviewed in EPIC  Cultures sent and are pending. Culture Date Result    Blood cx # 1     Blood cx # 2     Urine cx       Assessment:  · Consulted by Dr. Elza Ziegler, APRN - CNP to dose/monitor vancomycin  · Goal trough level:  15-20 mcg/mL, AUC/ZEYAD: 400-600  · Pt is a 83 y/o male who presented  with altered mental status and severe sepsis with end organ dysfunction secondary to catheter acquired UTI  · Serum creatinine today: 2.2; CrCl ~ 24 mL/min; baseline Scr ~ 1.1 mg/dL      Plan:  · Vancomycin 1000 mg IV x 1  · Dose by levels d/t ARF, random level tomorrow morning  · Follow renal function   · Pharmacist will follow and monitor/adjust dosing as necessary      Thank you for the consult,    Claudene Perfect PharmD Candidate     I have reviewed the above information with the pharmacy student/resident. Any changes can made are noted in bold/italics and or .     Amanda Soria, PharmD, BCPS 8/24/2021 7:10 PM   [phone]

## 2021-08-24 NOTE — CONSULTS
[facility]  Consult Note    [address] [address]  [place], [address]  Phone [phone]   [phone]      Admit Date: 2021 10:31 AM  Pt Name: Dayanara Adair  MRN: [id number]  : 1935  Reason for Consult:    Chief Complaint   Patient presents with    Altered Mental Status     fall on saturday, hematoma to R eye, eliquis held since 8/10 alert to self, normally a&ox4, normally RA on 5 L for O2 sat 85%     Requesting Physician:  Dayanara Kessler MD  PCP: Magno Akers MD  History Obtained From:  patient, chart   ID consulted for Severe sepsis with acute organ dysfunction ([facility] [address] [A41.9, R65.20]  on hospital day 0  CHIEF COMPLAINT       Chief Complaint   Patient presents with    Altered Mental Status     fall on saturday, hematoma to R eye, eliquis held since 8/10 alert to self, normally a&ox4, normally RA on 5 L for O2 sat 85%     HISTORYOF PRESENT ILLNESS   Venu Diamond is a 82 y.o. male who presents with significant past medical history of  has a past medical history of Anxiety, Atrial fibrillation ([address] COPD (chronic obstructive pulmonary disease) ([address] GERD (gastroesophageal reflux disease), High cholesterol, and Prostate CA ([address] ED TRIAGEVITALS  BP: 109/86, Temp: 97.8 °F (36.6 °C), Pulse: 87, Resp: (!) 36, SpO2: 100 %  HPI  Pt was recently d/c from [facility] to [address] [place] [address] is a 82year old male patient with the PMH of paroxysmal A.fib (on Eliquis), Bradyarrhythmia (on dual chamber pacemaker), dyslipidemia, prostate carcinoma, COVID-19 (positive on ), multiple h/o falls came to the ED because of a  fall and is being evaluated for recent multiple falls at home and weakness. FROM [facility]   Assessment & Plan:    Fall   Head CT negative     Paroxysmal A.fib   Home meds were Eliquis, diltiazem, sotalol    As per Cardiology: Sotalol stopped . Amiodarone started . Toprol XL.  Eliquis restarted    Patient had hypotension on July 26, was started on Midodrine. Pneumonia due to Pseudomonas    Sputum cx, Respiratory pathogens- positive for Pseudomonas   Urine legionella and strep ag - Negative   COVID 19 PCR test negativet. Urinary Retention   Reeves in place   He pulled out his reeves yesterday at midnight but was placed back again. As per Urology: Maintain Reeves catheter. Can try voiding trial when patient is stronger. on 0.4 mg of tamsulosin daily. Anemia/ acute GI bleed   Hb-8.9>9.4>8.4   baseline Hb was around 10.1 this year   Iron studies: Iron 34, TIBS 147, % Sat 23, Transferrin 137, Ferritin 2887   FOBT - Positve    Vit. Z33: 282  and folic acid 32.3 wnl   S/P IV iron once    GI consulted: considering EGD and colonoscopy as an outpatient unless patient started to have significant GI bleeding while in the hospital.   We may need to consider Watchman device based on the results of his GI workup    Sacral Ulcer   Stage II   Wound care consulted    Bradyarrhythmia s/p dual-chamber pacemaker   Placement interrogation ordered    Prostate carcinoma   Completed all the chemo and radiotherapy sessions, total of 44 as per the daughter   Follows up at [address] [place] [address]    Diarrhoea: C diff + at [address] right chest resolving     Pt from [facility] with change of MS'  Poor historian   Family present   H/o from daughter  Pt was in [facility] had falls  Was on rx for cdiff/vzv      REVIEW OF SYSTEMS     CONSTITUTIONAL:   As in hpi     Not in a hospital admission.   CURRENT MEDICATIONS     Current Facility-Administered Medications:     albuterol (PROVENTIL) nebulizer solution 2.5 mg, 2.5 mg, Nebulization, Q4H PRN, VERONA Rosa CNP    Arformoterol Tartrate (BROVANA) nebulizer solution 15 mcg, 15 mcg, Nebulization, BID, VERONA Rosa - CNP    sodium chloride flush 0.9 % injection 5-40 mL, 5-40 mL, IntraVENous, 2 times per day, Andrew Acuna APRN - CNP    sodium chloride flush 0.9 % injection TABLET BY MOUTH TWICE A DAY, Disp: , Rfl: 3    ELIQUIS 5 MG TABS tablet, TAKE 1 TABLET TWICE A DAY BY MOUTH, Disp: , Rfl: 3    bicalutamide (CASODEX) 50 MG chemo tablet, TAKE 1 TABLET BY MOUTH EVERY DAY, Disp: , Rfl: 5    ALPRAZolam (XANAX) 0.5 MG tablet, TAKE 1 TABLET BY MOUTH EVERY DAY, Disp: , Rfl: 0    simvastatin (ZOCOR) 20 MG tablet, Take 20 mg by mouth nightly, Disp: , Rfl:     clotrimazole-betamethasone (LOTRISONE) 1-0.05 % cream, Apply topically 2 times daily Apply topically 2 times daily. , Disp: , Rfl:   ALLERGIES     Amoxicillin  Immunization History   Administered Date(s) Administered    COVID-19, Pfizer, PF, 30mcg/0.3mL 02/17/2021, 03/20/2021     PAST MEDICAL HISTORY     Past Medical History:   Diagnosis Date    Anxiety     Atrial fibrillation (HCC)     COPD (chronic obstructive pulmonary disease) (HCC)     GERD (gastroesophageal reflux disease)     High cholesterol     Prostate CA ([facility] [address]      SURGICAL HISTORY       Past Surgical History:   Procedure Laterality Date    CATARACT REMOVAL WITH IMPLANT      both eyes    COLONOSCOPY      KNEE ARTHROSCOPY      right knee    PROSTATE BIOPSY      TONSILLECTOMY       FAMILY HISTORY       Family History   Problem Relation Age of Onset    Cancer Mother     Cancer Father     Cancer Brother      SOCIAL HISTORY       Social History     Socioeconomic History    Marital status:      Spouse name: None    Number of children: None    Years of education: None    Highest education level: None   Occupational History    None   Tobacco Use    Smoking status: Former Smoker    Smokeless tobacco: Current User     Types: Chew   Vaping Use    Vaping Use: Never used   Substance and Sexual Activity    Alcohol use: Not Currently    Drug use: Never    Sexual activity: Not Currently   Other Topics Concern    None   Social History Narrative    None     Social Determinants of Health     Financial Resource Strain:     Difficulty of Paying Living Expenses:    Food Insecurity:     Worried About Running Out of Food in the Last Year:     [address] in the Last Year:    Transportation Needs:     Lack of Transportation (Medical):  Lack of Transportation (Non-Medical):    Physical Activity:     Days of Exercise per Week:     Minutes of Exercise per Session:    Stress:     Feeling of Stress :    Social Connections:     Frequency of Communication with Friends and Family:     Frequency of Social Gatherings with Friends and Family:     Attends [employer] Services:     Active Member of Clubs or Organizations:     Attends Club or Organization Meetings:     Marital Status:    Intimate Partner Violence:     Fear of Current or Ex-Partner:     Emotionally Abused:     Physically Abused:     Sexually Abused:        PHYSICAL EXAM       ED Triage Vitals   BP Temp Temp Source Pulse Resp SpO2 Height Weight   08/24/21 1033 08/24/21 1033 08/24/21 1033 08/24/21 1033 08/24/21 1033 08/24/21 1141 08/24/21 1033 08/24/21 1033   101/65 97.8 °F (36.6 °C) Temporal 88 (!) 40 97 % 6' 2\" (1.88 m) 149 lb 4 oz (67.7 kg)     Vitals:    08/24/21 1033 08/24/21 1141 08/24/21 1252   BP: 101/65  109/86   Pulse: 88  87   Resp: (!) 40  (!) 36   Temp: 97.8 °F (36.6 °C)     TempSrc: Temporal     SpO2:  97% 100%   Weight: 149 lb 4 oz (67.7 kg)     Height: 6' 2\" (1.88 m)       Physical Exam   Constitutional/General: agitated NAD thin   Head: NC/AT  Eyes: PERRL, EOMI  Mouth: Normal mucosa, no thrush   Neck: Supple, full ROM,    Pulmonary: Lungs dec to auscultation bilaterally. Not in respiratory distress  Cardiovascular:  Regular rate and rhythm, no murmurs, gallops, or rubs. Abdomen: Soft, + BS. No distension. Nontender. Extremities: Moves all extremities x 4.    Warm and well perfused  Pulses:  Distal pulses intact  Skin: Warm and dry without rash  Neurologic:    restless  Psych: agiatated  Bernal      DIAGNOSTIC RESULTS   RADIOLOGY:   CT ABDOMEN PELVIS WO CONTRAST Additional Contrast? None    Result Date: 8/24/2021  EXAMINATION: CT OF THE ABDOMEN AND PELVIS WITHOUT CONTRAST 8/24/2021 12:33 pm TECHNIQUE: CT of the abdomen and pelvis was performed without the administration of intravenous contrast. Multiplanar reformatted images are provided for review. Dose modulation, iterative reconstruction, and/or weight based adjustment of the mA/kV was utilized to reduce the radiation dose to as low as reasonably achievable. COMPARISON: June 13, 2019 HISTORY: ORDERING SYSTEM PROVIDED HISTORY: fall, trauma, abd ttp TECHNOLOGIST PROVIDED HISTORY: Reason for exam:->fall, trauma, abd ttp Additional Contrast?->None Decision Support Exception - unselect if not a suspected or confirmed emergency medical condition->Emergency Medical Condition (MA) FINDINGS: No bowel obstruction, free air, or free fluid. Moderate amount of stool throughout the colon. The appendix is visualized and normal in the right lower quadrant. Bernal catheter is present. Surgical changes at level of the prostate gland. No retroperitoneal lymphadenopathy. No intrahepatic or extrahepatic bile duct dilatation. Hyperattenuating material located in the gallbladder. No evidence of acute pancreatitis. Spleen is nonenlarged. There is no hydronephrosis. No renal or ureter calculus. Bilateral inguinal hernias measuring up to 2.9 cm on the right and 3.7 cm on the left. The hernias have increased in size compared to prior from June 13, 2019. Generalized sclerosis involving the osseous structures. No evidence of lumbar compression fracture. 1. Diffuse osseous metastases. 2. No acute process in the abdomen or pelvis. 3. Hyperattenuating material located in the gallbladder could suggest hyperdense sludge.      CT Head WO Contrast    Result Date: 8/24/2021  EXAMINATION: CT OF THE HEAD WITHOUT CONTRAST  8/24/2021 12:33 pm TECHNIQUE: CT of the head was performed without the administration of intravenous contrast. Dose modulation, iterative reconstruction, and/or weight based adjustment of the mA/kV was utilized to reduce the radiation dose to as low as reasonably achievable. COMPARISON: None. HISTORY: ORDERING SYSTEM PROVIDED HISTORY: fall TECHNOLOGIST PROVIDED HISTORY: Reason for exam:->fall Has a \"code stroke\" or \"stroke alert\" been called? ->No Decision Support Exception - unselect if not a suspected or confirmed emergency medical condition->Emergency Medical Condition (MA) FINDINGS: BRAIN/VENTRICLES: There is no acute intracranial hemorrhage, mass effect or midline shift. No abnormal extra-axial fluid collection. The gray-white differentiation is maintained without evidence of an acute infarct. There is no evidence of hydrocephalus. Periventricular white matter changes consistent chronic microvascular disease. Diffuse volume loss. ORBITS: The visualized portion of the orbits demonstrate no acute abnormality. SINUSES: The visualized paranasal sinuses and mastoid air cells demonstrate no acute abnormality. SOFT TISSUES/SKULL:  No acute abnormality of the visualized skull or soft tissues. No acute intracranial abnormality. Periventricular white matter changes consistent chronic microvascular disease. Diffuse volume loss. CT CHEST WO CONTRAST    Result Date: 8/24/2021  EXAMINATION: CT OF THE CHEST WITHOUT CONTRAST 8/24/2021 12:33 pm TECHNIQUE: CT of the chest was performed without the administration of intravenous contrast. Multiplanar reformatted images are provided for review. Dose modulation, iterative reconstruction, and/or weight based adjustment of the mA/kV was utilized to reduce the radiation dose to as low as reasonably achievable.  COMPARISON: January 9, 2020 HISTORY: ORDERING SYSTEM PROVIDED HISTORY: fall, trauma, hypoxia TECHNOLOGIST PROVIDED HISTORY: Reason for exam:->fall, trauma, hypoxia Decision Support Exception - unselect if not a suspected or confirmed emergency medical condition->Emergency Medical Condition (MA) FINDINGS: New moderate bilateral pleural effusions. Smooth interlobular septal thickening throughout both lungs. Mild cardiomegaly. No pericardial effusion. No abnormal mediastinal fluid collections. No mediastinal or hilar lymphadenopathy. Left pacemaker is present. No pneumothorax. Diffuse bony sclerosis demonstrated. 1. New moderate to large bilateral pleural effusions. 2. Interstitial prominence throughout both lungs suggesting interstitial pulmonary edema. 3. Diffuse osseous metastases. CT Cervical Spine WO Contrast    Result Date: 8/24/2021  EXAMINATION: CT OF THE CERVICAL SPINE WITHOUT CONTRAST 8/24/2021 12:33 pm TECHNIQUE: CT of the cervical spine was performed without the administration of intravenous contrast. Multiplanar reformatted images are provided for review. Dose modulation, iterative reconstruction, and/or weight based adjustment of the mA/kV was utilized to reduce the radiation dose to as low as reasonably achievable. COMPARISON: None. HISTORY: ORDERING SYSTEM PROVIDED HISTORY: fall TECHNOLOGIST PROVIDED HISTORY: Reason for exam:->fall Decision Support Exception - unselect if not a suspected or confirmed emergency medical condition->Emergency Medical Condition (MA) FINDINGS: The ring of C1 is intact as is the dense. There is no compression fracture of the cervical spine. No jumped or perched facet is noted. Multilevel degenerative disc and degenerative joint disease is noted. The prevertebral soft tissues are unremarkable. The airway is widely patent. Images through the lung apices are negative for a pneumothorax. 1. There is no acute compression fracture or subluxation of the cervical spine. 2. Multilevel degenerative disc and degenerative joint disease. XR CHEST 1 VIEW    Result Date: 8/24/2021  EXAMINATION: ONE XRAY VIEW OF THE CHEST 8/24/2021 1:47 pm COMPARISON: None.  HISTORY: ORDERING SYSTEM PROVIDED HISTORY: shortness of breath TECHNOLOGIST PROVIDED HISTORY: Reason for exam:->shortness of breath FINDINGS: There is patchy opacification right lung which could represent a combination of a pleural effusion and right lung airspace disease. There is a trace left pleural effusion The heart is mildly enlarged. There is a dual lead cardiac pacer on the left. 1. Right lung airspace disease and right pleural effusion. 2. Trace left pleural effusion.      LABS  Recent Labs     08/24/21  1110   WBC 10.6   HGB 8.3*   HCT 26.7*   MCV 93.7        Recent Labs     08/24/21  1110      K 5.9*   *   CO2 19*   BUN 32*   CREATININE 2.2*   GFRAA 35   LABGLOM 29   GLUCOSE 93   PROT 6.0*   LABALBU 2.9*   CALCIUM 5.9*   BILITOT 0.5   ALKPHOS 582*   AST 69*   ALT 18        Lab Results   Component Value Date    COVID19 Not Detected 08/24/2021    COVID19 Not Detected 01/15/2021     COVID-19/WILLAM-COV2 LABS  Recent Labs     08/24/21  1110   INR 1.4   PROTIME 16.1*   AST 69*   ALT 18     Lab Results   Component Value Date    CHOL 151 01/06/2016    TRIG 88 01/06/2016    HDL 52 10/11/2018    LDLCALC 73 10/11/2018    LABVLDL 16 10/11/2018        MICROBIOLOGY:           Patient is a 82 y.o. male who presented with   Chief Complaint   Patient presents with    Altered Mental Status     fall on saturday, hematoma to R eye, eliquis held since 8/10 alert to self, normally a&ox4, normally RA on 5 L for O2 sat 85%        FINAL IMPRESSION         Néstor King is a 82year old male patient with the PMH of paroxysmal A.fib (on Eliquis), Bradyarrhythmia (on dual chamber pacemaker), dyslipidemia, prostate carcinoma, COVID-19 (positive on 16th December), multiple h/o falls came to the ED because of a  falls and change of MS  On rx for cdiff/VZV at [facility]  currenlty not appropriate  Poss HCAP s/p rx Pneumonia due to Pseudomonas    Previous Sputum cx positive for Pseudomonas   S/P Cefepime    H/o COVID 19    Current chest  Right lung airspace disease and right pleural effusion  Gallbladder sludge  Urinary Retention with h/o Prostate CA sclerosis involving both iliac bones and both ischemia as  well as the right sacrum and L5 vertebra compatible with blastic osseous metastatic disease. Bernal in place    Sacral Ulcer   Stage II POA douglas snot appear infected    Wound care consulted  S/p Rx Cdiff Diarrhea:    vancomycin (VANCOCIN) 1,000 mg in dextrose 5 % 250 mL IVPB, Once  cefepime (MAXIPIME) 1,000 mg in dextrose 5 % 50 mL extended infusion IVPB, Q12H switch to meropenem           Imaging and labs were reviewed per medical records and any ID pertinent labs were addressed with the patient. The patient/FAMILY  was educated about the diagnosis, prognosis, indications, risks and benefits of treatment. An opportunity to ask questions was given to the patient/FAMILY and questions were answered. Thank you for involving me in the care of Nancy Kline. Please do not hesitate to call for any questions or concerns.          Electronically signed by Nini Benitez MD on 8/24/2021 at 2:11 PM          N

## 2021-08-24 NOTE — CARE COORDINATION
SS Note: Covid negative 8/24/21. Pt in ED, SW completed transition of care with pt's wife and pt's daughter/POA Saleem Mijares. SW placed copy of POA forms in soft chart. Family stated pt is at [facility] for SNF rehab stay adding they are not paying to hold the bed however if a bed is available prefer pt return there for further rehab. Per [facility], no available beds at this time, they will need to check bed availability closer to pt discharge, family aware of above.   Electronically signed by ML Steen on 8/24/2021 at 3:17 PM

## 2021-08-24 NOTE — ED PROVIDER NOTES
exudate. Eyes:      General: No scleral icterus. Pupils: Pupils are equal, round, and reactive to light. Cardiovascular:      Rate and Rhythm: Normal rate and regular rhythm. Heart sounds: Normal heart sounds. No murmur heard. Comments: 2+ radial and dorsal pedis pulses bilaterally  Pulmonary:      Effort: No respiratory distress. Breath sounds: No wheezing. Comments: Mild increase in respiratory rate diminished breath sounds bilaterally. Abdominal:      Palpations: Abdomen is soft. Tenderness: There is abdominal tenderness. There is no guarding or rebound. Comments: Diffuse abdominal tenderness. Genitourinary:     Comments: Bernal catheter in place with yellow-colored urine in bag. Musculoskeletal:         General: No tenderness or deformity. Normal range of motion. Cervical back: Normal range of motion and neck supple. Right lower leg: No edema. Left lower leg: No edema. Skin:     General: Skin is warm and dry. Capillary Refill: Capillary refill takes less than 2 seconds. Findings: No rash. Neurological:      General: No focal deficit present. Mental Status: He is alert. Cranial Nerves: No cranial nerve deficit. Sensory: No sensory deficit. Motor: No weakness or abnormal muscle tone. Comments: Oriented to person. When asked states the year is 46. Not oriented to place. Moving all extremities. Psychiatric:         Behavior: Behavior normal.      Comments: Cooperative. Answers some questions appropriately. Very hard of hearing. Procedures     MDM     This is an 73-year-old male with a history of atrial fibrillation on Eliquis, COPD intermittently on nasal cannula oxygen who presents from the nursing home with concerns of altered mental status. In the emergency department the patient is awake and alert, hemodynamically stable. Oriented to person but not time or place.   Lipase plan per family members in Range    WBC 10.6 4.5 - 11.5 E9/L    RBC 2.85 (L) 3.80 - 5.80 E12/L    Hemoglobin 8.3 (L) 12.5 - 16.5 g/dL    Hematocrit 26.7 (L) 37.0 - 54.0 %    MCV 93.7 80.0 - 99.9 fL    MCH 29.1 26.0 - 35.0 pg    MCHC 31.1 (L) 32.0 - 34.5 %    RDW 22.6 (H) 11.5 - 15.0 fL    Platelets 014 928 - 946 E9/L    MPV 10.4 7.0 - 12.0 fL    Neutrophils % 86.0 (H) 43.0 - 80.0 %    Lymphocytes % 3.0 (L) 20.0 - 42.0 %    Monocytes % 6.0 2.0 - 12.0 %    Eosinophils % 3.0 0.0 - 6.0 %    Basophils % 0.0 0.0 - 2.0 %    Neutrophils Absolute 9.33 (H) 1.80 - 7.30 E9/L    Lymphocytes Absolute 0.32 (L) 1.50 - 4.00 E9/L    Monocytes Absolute 0.64 0.10 - 0.95 E9/L    Eosinophils Absolute 0.32 0.05 - 0.50 E9/L    Basophils Absolute 0.00 0.00 - 0.20 E9/L    Metamyelocytes Relative 2.0 (H) 0.0 - 1.0 %    nRBC 4.0 /100 WBC    Anisocytosis 2+     Polychromasia 1+     Hypochromia 1+     Poikilocytes 1+     Tear Drop Cells 1+    Comprehensive Metabolic Panel w/ Reflex to MG   Result Value Ref Range    Sodium 144 132 - 146 mmol/L    Potassium reflex Magnesium 5.9 (H) 3.5 - 5.0 mmol/L    Chloride 110 (H) 98 - 107 mmol/L    CO2 19 (L) 22 - 29 mmol/L    Anion Gap 15 7 - 16 mmol/L    Glucose 93 74 - 99 mg/dL    BUN 32 (H) 6 - 23 mg/dL    CREATININE 2.2 (H) 0.7 - 1.2 mg/dL    GFR Non-African American 29 >=60 mL/min/1.73    GFR African American 35     Calcium 5.9 (LL) 8.6 - 10.2 mg/dL    Total Protein 6.0 (L) 6.4 - 8.3 g/dL    Albumin 2.9 (L) 3.5 - 5.2 g/dL    Total Bilirubin 0.5 0.0 - 1.2 mg/dL    Alkaline Phosphatase 582 (H) 40 - 129 U/L    ALT 18 0 - 40 U/L    AST 69 (H) 0 - 39 U/L   Lipase   Result Value Ref Range    Lipase 28 13 - 60 U/L   Troponin   Result Value Ref Range    Troponin, High Sensitivity 77 (H) 0 - 11 ng/L   Protime-INR   Result Value Ref Range    Protime 16.1 (H) 9.3 - 12.4 sec    INR 1.4    Urinalysis, reflex to microscopic   Result Value Ref Range    Color, UA Yellow Straw/Yellow    Clarity, UA CLOUDY (A) Clear    Glucose, Ur Negative Negative mg/dL    Bilirubin Urine MODERATE (A) Negative    Ketones, Urine TRACE (A) Negative mg/dL    Specific Gravity, UA 1.020 1.005 - 1.030    Blood, Urine TRACE (A) Negative    pH, UA 6.0 5.0 - 9.0    Protein, UA Negative Negative mg/dL    Urobilinogen, Urine 0.2 <2.0 E.U./dL    Nitrite, Urine Negative Negative    Leukocyte Esterase, Urine MODERATE (A) Negative   Lactate, Sepsis   Result Value Ref Range    Lactic Acid, Sepsis 5.0 (HH) 0.5 - 1.9 mmol/L   Blood Gas, Arterial   Result Value Ref Range    Date Analyzed [id number]     Time Analyzed 1153     Source: Blood Arterial     pH, Blood Gas 7.411 7.350 - 7.450    PCO2 25.8 (L) 35.0 - 45.0 mmHg    PO2 65.2 (L) 75.0 - 100.0 mmHg    HCO3 16.0 (L) 22.0 - 26.0 mmol/L    B.E. -7.5 (L) -3.0 - 3.0 mmol/L    O2 Sat 91.1 (L) 92.0 - 98.5 %    O2Hb 90.6 (L) 94.0 - 97.0 %    COHb 0.3 0.0 - 1.5 %    MetHb 0.2 0.0 - 1.5 %    O2 Content 11.3 mL/dL    HHb 8.9 (H) 0.0 - 5.0 %    tHb (est) 8.8 (L) 11.5 - 16.5 g/dL    Mode NC- 5 L     Date Of Collection      Time Collected      Pt Temp 37.0 C     ID [id number]     Lab [id number]     Critical(s) Notified . No Critical Values    Microscopic Urinalysis   Result Value Ref Range    WBC, UA >20 (A) 0 - 5 /HPF    RBC, UA 2-5 0 - 2 /HPF    Epithelial Cells, UA RARE /HPF    Bacteria, UA MANY (A) None Seen /HPF   Lactate, Sepsis   Result Value Ref Range    Lactic Acid, Sepsis 1.9 0.5 - 1.9 mmol/L   Digoxin level   Result Value Ref Range    Digoxin Lvl 3.9 (HH) 0.8 - 2.0 ng/mL   EKG 12 Lead   Result Value Ref Range    Ventricular Rate 125 BPM    Atrial Rate 97 BPM    QRS Duration 100 ms    Q-T Interval 298 ms    QTc Calculation (Bazett) 430 ms    R Axis -54 degrees    T Axis -61 degrees       RADIOLOGY:  XR HIP RIGHT (2-3 VIEWS)   Final Result   Extensive bony sclerosis compatible with blastic osseous metastatic disease. 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 XR CHEST 1 VIEW   Final Result   1.  Right lung airspace disease and right pleural effusion. 2. Trace left pleural effusion. CT Head WO Contrast   Final Result   No acute intracranial abnormality. Periventricular white matter changes consistent chronic microvascular   disease. Diffuse volume loss. CT Cervical Spine WO Contrast   Final Result   1. There is no acute compression fracture or subluxation of the cervical   spine. 2. Multilevel degenerative disc and degenerative joint disease. CT CHEST WO CONTRAST   Final Result   1. New moderate to large bilateral pleural effusions. 2. Interstitial prominence throughout both lungs suggesting interstitial   pulmonary edema. 3. Diffuse osseous metastases. CT ABDOMEN PELVIS WO CONTRAST Additional Contrast? None   Final Result   1. Diffuse osseous metastases. 2. No acute process in the abdomen or pelvis. 3. Hyperattenuating material located in the gallbladder could suggest   hyperdense sludge. US GALLBLADDER RUQ    (Results Pending)       EKG: This EKG is signed and interpreted by me. Rate: 125bpm  Rhythm: sinus  Interpretation: Artifact. Nonspecific ST changes. No segment elevation. Comparison: no previous EKG available      ------------------------- NURSING NOTES AND VITALS REVIEWED ---------------------------  Date / Time Roomed:  8/24/2021 10:31 AM  ED Bed Assignment:  03/03    The nursing notes within the ED encounter and vital signs as below have been reviewed.      Patient Vitals for the past 24 hrs:   BP Temp Temp src Pulse Resp SpO2 Height Weight   08/24/21 1252 109/86   87 (!) 36 100 %     08/24/21 1141      97 %     08/24/21 1033 101/65 97.8 °F (36.6 °C) Temporal 88 (!) 40  6' 2\" (1.88 m) 149 lb 4 oz (67.7 kg)       Oxygen Saturation Interpretation: Abnormal    ------------------------------------------ PROGRESS NOTES ------------------------------------------    Counseling:  I have spoken with the patient and discussed todays results, in addition to providing specific details for the plan of care and counseling regarding the diagnosis and prognosis. Their questions are answered at this time and they are agreeable with the plan of admission.    --------------------------------- ADDITIONAL PROVIDER NOTES ---------------------------------  Consultations:  Spoke with Dr. Diana Torres. Discussed case. They will admit the patient. This patient's ED course included: a personal history and physicial examination, re-evaluation prior to disposition, multiple bedside re-evaluations, IV medications, cardiac monitoring and continuous pulse oximetry    This patient has remained hemodynamically stable during their ED course. Diagnosis:  1. Sepsis, due to unspecified organism, unspecified whether acute organ dysfunction present ([facility]    2. Altered mental status, unspecified altered mental status type    3. CLINTON (acute kidney injury) ([facility]    4. Hyperkalemia    5. UTI (urinary tract infection), bacterial        Disposition:  Patient's disposition: Admit to telemetry  Patient's condition is fair.          Elizabeth Perdue,   Resident  08/24/21 1135

## 2021-08-24 NOTE — H&P
Department of Internal Medicine  History and Physical Examination     Primary Care Physician: Rex Ruiz MD   Admitting Physician:  Jonathan Denny DO  Admission date and time: 8/24/2021 10:31 AM    Room:  03/03  Admitting diagnosis: Severe sepsis with acute organ dysfunction ([facility] [A41.9, R65.20]    Patient Name: Nancy Kline  MRN: [id number]    Date of Service: 8/24/2021     Chief Complaint:  AMS    HISTORY OF PRESENT ILLNESS:    Simon Fisher is an 47-year-old male patient who presented to the emergency department today for altered mental status. He recently was hospitalized at [facility] where he initially was quite lucid but developed progressively worsening agitation and confusion about 5 days or so into that hospitalization. He was discharged but was found to be worsening in regards to mentation. Had a fall today with prolonged downtime at home of approximately 8 or 9 hours. He was evaluated in the emergency department and multiple abnormalities were noted. Acute hospitalization was recommended. Dr. Karla Newman was consulted whom advised coverage for his primary care provider. His family was present at bedside including wife and daughter and they have augmented the history. Patient is seen and examined at bedside. He is agitated, lethargic and unable to participate in the subjective data collection. Again, wife and daughter provide much of the history. He did have a fall with head injury and is anticoagulated with Eliquis. Did not receive Eliquis in 2 days or so. Multiple recent medication adjustments were made during hospitalization at [facility].  He is no longer on sotalol. He is now on metoprolol of an unknown dose as well as digoxin. He is on antineoplastic therapy in the setting of metastatic prostate cancer and follows with oncology. Does follow with nephrology as an outpatient as well as a lung doctor.   He is apparently having hallucinations and is reaching for things mouth daily    Historical Provider, MD   zinc sulfate (ZINCATE) 220 (50 Zn) MG capsule Take 1 capsule by mouth daily for 7 days 12/7/20 12/14/20  VERONA De Jesus CNP   vitamin C (ASCORBIC ACID) 500 MG tablet Take 1 tablet by mouth 2 times daily for 7 days 12/7/20 12/14/20  VERONA De Jesus CNP   sotalol (BETAPACE) 80 MG tablet TAKE 1 TABLET BY MOUTH TWICE A DAY 5/16/19   Historical Provider, MD   ELIQUIS 5 MG TABS tablet TAKE 1 TABLET TWICE A DAY BY MOUTH 5/12/19   Historical Provider, MD   bicalutamide (CASODEX) 50 MG chemo tablet TAKE 1 TABLET BY MOUTH EVERY DAY 4/3/19   Historical Provider, MD   ALPRAZolam Chaka Ket) 0.5 MG tablet TAKE 1 TABLET BY MOUTH EVERY DAY 4/23/19   Historical Provider, MD   simvastatin (ZOCOR) 20 MG tablet Take 20 mg by mouth nightly    Historical Provider, MD   clotrimazole-betamethasone (LOTRISONE) 1-0.05 % cream Apply topically 2 times daily Apply topically 2 times daily. Historical Provider, MD       ALLERGIES:  Amoxicillin    SOCIAL Hx:  Social History     Socioeconomic History    Marital status:      Spouse name: Not on file    Number of children: Not on file    Years of education: Not on file    Highest education level: Not on file   Occupational History    Not on file   Tobacco Use    Smoking status: Former Smoker    Smokeless tobacco: Current User     Types: Chew   Vaping Use    Vaping Use: Never used   Substance and Sexual Activity    Alcohol use: Not Currently    Drug use: Never    Sexual activity: Not Currently   Other Topics Concern    Not on file   Social History Narrative    Not on file     Social Determinants of Health     Financial Resource Strain:     Difficulty of Paying Living Expenses:    Food Insecurity:     Worried About Running Out of Food in the Last Year:     [address] in the Last Year:    Transportation Needs:     Lack of Transportation (Medical):      Lack of Transportation (Non-Medical):    Physical Activity:     his last hospitalization. We will employ empiric antibiotic therapy and ask the pharmacy team to dose vancomycin and infectious disease team to follow. Further complicating matters is the patient's recent C. difficile infection and the unknown status of his treatment for this. Broad infectious work-up will be undertaken. His anemia will be further evaluated with anemia indices. Respiratory supportive measures will be employed in the setting of COPD and we will asked the pulmonology team to follow. Again, there is a high likelihood of decompensation and the patient may require escalation to intensive care unit. Once clinical condition allows, we may move forward with neuro imaging such as MRI and carotid ultrasound however his current encephalopathy and agitation would prohibit appropriate testing. Haldol will be utilized as needed for safety as the patient is high fall risk, agitated and may accidentally injure himself via fall from bed. Underlying co-morbidites will be addressed during hospitalization as well. Labs and vital signs will be monitored closely and addressed accordingly. See additional orders for details. Greater than 40 minutes of critical care time was spent with the patient. This time included chart review, , and discussion with those consultants involved in the patient's care.     VERONA Donovan CNP, APRN-CNP  2:18 PM  8/24/2021    Electronically signed by VERONA Donovan CNP on 8/24/21 at 2:18 PM EDT

## 2021-08-24 NOTE — VCC REMOTE MONITORING
Spoke with primary RN Ting Walker regarding 3 and 6 hour Sepsis bundle.   Thank you,    Chasity Rain [address] BSN, [address]  Callback# [phone]

## 2021-08-25 NOTE — PROGRESS NOTES
Date:2021  Patient Name: Madelaine Hoyt  MRN: [id number]  : 1935  ROOM #: [id number]    Occupational Therapy order received, chart reviewed and evaluation attempted this date. Per RN, patient agitated and confused this AM. Pt not appropriate for OT evaluation at this time. Will re-attempt OT evaluation at a later time, pending appropriateness. Thank you.      Marina Desai OTR/L #[id number]

## 2021-08-25 NOTE — PROGRESS NOTES
Internal Medicine Progress Note    [facility]    Julian Amato. Lexus Saavedra., F.A.CSumayaOSumayaISumaya Harris D.O., ANNEMARIEOCARLENE Murphy D.O. Sakshi Butler, MSN, APRN, NP-C  Ana Luisa Early. Lizbeth Jennings, MSN, APRN-CNP     Primary Care Physician: Annette Davalos MD   Admitting Physician:  Marysol Santo DO  Admission date and time: 8/24/2021 10:31 AM    Room:  [address] [place] [address]  Admitting diagnosis: Hyperkalemia [E87.5]  CLINTON (acute kidney injury) ([facility] [N17.9]  UTI (urinary tract infection), bacterial [N39.0, A49.9]  Severe sepsis with acute organ dysfunction ([facility] [A41.9, R65.20]  Altered mental status, unspecified altered mental status type [R41.82]  Sepsis, due to unspecified organism, unspecified whether acute organ dysfunction present [facility]) [A41.9]    Patient Name: Jesika Beckham  MRN: [id number]    Date of Service: 8/25/2021     Subjective: Tequila Trevizo is a 82 y.o. male who was seen and examined today,8/25/2021, at the bedside. He remains altered, agitated and is unable to provide subjective information. This is thusly limited as no family present during my examination. Review of System: unable to be obtained due to condition/AMS    Physical Exam:  No intake/output data recorded. Intake/Output Summary (Last 24 hours) at 8/25/2021 1039  Last data filed at 8/25/2021 5600  Gross per 24 hour   Intake    Output 550 ml   Net -550 ml   I/O last 3 completed shifts:  In: -   Out: 550 [Urine:550]  Patient Vitals for the past 96 hrs (Last 3 readings):   Weight   08/25/21 0515 154 lb (69.9 kg)   08/25/21 0042 154 lb (69.9 kg)   08/24/21 1033 149 lb 4 oz (67.7 kg)     Vital Signs:   Blood pressure 105/86, pulse 105, temperature 99.1 °F (37.3 °C), temperature source Axillary, resp. rate 22, height 6' 2\" (1.88 m), weight 154 lb (69.9 kg), SpO2 100 %. General appearance:  Lethargic, awake, altered  Head:  Normocephalic. No masses, lesions or tenderness. Eyes:  PERRLA. EOMI. Sclera clear. Buccal mucosa moist.  ENT:  Ears normal. Mucosa slightly dry  Neck:    Supple. Trachea midline. No thyromegaly. No JVD. No bruits. Heart:    Somewhat irregular rhythm with normal to mildly tachycardic rate, S1-S2, grade 3/6 systolic murmur appreciated  Lungs:    Symmetrical.  Coarse breath sounds bilaterally with shallow and mildly cachectic respiratory pattern  Abdomen:   Soft. Scaphoid contour, non-tender. Non-distended. Bowel sounds positive. No organomegaly or masses. No pain on palpation. Extremities:    Peripheral pulses present. No significant pitting peripheral edema. No ulcers. No cyanosis. No clubbing. Neurologic:    Lethargic, awake and altered, nonfocal  Psych:   Behavior is agitated and not able to redirect  Musculoskeletal:   No unilateral joint edema, erythema or warmth. Gait not assessed. Integumentary:  No rashes  Skin normal color and texture.   Genitalia/Breast:  Deferred    Medication:  Scheduled Meds:   calcium gluconate IVPB  2,000 mg IntraVENous Once    Arformoterol Tartrate  15 mcg Nebulization BID    sodium chloride flush  5-40 mL IntraVENous 2 times per day    heparin (porcine)  5,000 Units Subcutaneous 3 times per day    metoprolol succinate  25 mg Oral Daily    lactobacillus  1 capsule Oral Q12H    meropenem  500 mg IntraVENous Q12H    vancomycin (VANCOCIN) intermittent dosing (placeholder)   Other RX Placeholder     Continuous Infusions:   sodium chloride      IV infusion builder 75 mL/hr at 08/24/21 2011       Objective Data:  CBC with Differential:    Lab Results   Component Value Date    WBC 10.6 08/24/2021    RBC 2.85 08/24/2021    HGB 8.3 08/24/2021    HCT 26.7 08/24/2021    HCT 26.9 08/24/2021     08/24/2021    MCV 93.7 08/24/2021    MCH 29.1 08/24/2021    MCHC 31.1 08/24/2021    RDW 22.6 08/24/2021    NRBC 4.0 08/24/2021    METASPCT 2.0 08/24/2021    LYMPHOPCT 3.0 08/24/2021    MONOPCT 6.0 08/24/2021    BASOPCT 0.0 08/24/2021    MONOSABS 0.64 08/24/2021 08/25/21 Coccyx (Active)   Dressing Status New dressing applied 08/25/21 0107   Wound Cleansed Irrigated with saline 08/25/21 0107   Wound Length (cm) 1 cm 08/25/21 0107   Wound Width (cm) 0.5 cm 08/25/21 0107   Wound Depth (cm) 0.25 cm 08/25/21 0107   Wound Surface Area (cm^2) 0.5 cm^2 08/25/21 0107   Wound Volume (cm^3) 0.125 cm^3 08/25/21 0107   Wound Assessment Dry 08/25/21 0107   Number of days: 0       Assessment:  · Severe sepsis with associated endorgan dysfunction secondary to catheter acquired urinary tract infection  · Acute renal failure superimposed on chronic kidney disease stage III in the setting of poor intake and dehydration  · Non-STEMI with findings of pulmonary edema and pleural effusions concerning for cardiomyopathy development  · Atrial fibrillation with variable ventricular response chronically anticoagulated with Eliquis, last dose August 22  · Acute on chronic anemia   · Hyperkalemia-resolved  · Hypoclacemia- refractory to supplementation with ionized calcium of 0.79  · Supratherapeutic digoxin level with normal to tachycardic rate, but with ARF, AMS and Hyperkalemia on presentation suggesting a near-toxic event  · COPD with mild exacerbation   · Metastatic prostate cancer with evidence of bony metastasis on imaging  · Metabolic acidosis with mildly elevated anion gap and lactic acidosis   · essential hypertension with borderline hypotension  · Sick sinus syndrome with dual pacemaker in situ September 16, 2020  · Sludge filled gallbladder with elevated alk phos AST, gallbladder ultrasound pending  · Severe protein calorie malnutrition  · Recent C. difficile infection with unknown treatment status    Plan: Judith Delgado is quite ill. Multiple issues are placed. He is septic with fevers despite empiric antibiotic therapy in the setting of a urinary tract infection and meets criteria for severe sepsis based upon altered mental status, endorgan dysfunction and elevated lactic acid.   ID is following, infectious work-up is being undertaken. Fluid resuscitation is being provided as well in the setting of lactic acidosis complicated by renal failure and metabolic acidosis. D5 with 75 mEq of bicarb were initiated in the emergency department at 75 mL's an hour and are being maintained with nephrology input pending. His calcium stores have been replaced twice and are refractory, 2 g of calcium gluconate have been ordered. Ionized calcium is low at 0.79. Further adjustments as per the nephrology team.  Of note, urine eosinophils are elevated at 2% suggesting a possible component of interstitial nephritis. The hyperkalemia responded well to treatment including fluids and Lokelma. Digoxin level was noted to be elevated and based on the constellation of altered mental status, renal failure, hyperkalemia but without bradycardia to suggest a near toxic event. Digoxin is being held and we await further input from the cardiovascular team.  Multiple subspecialists are following in consultation including pulmonology, nephrology, cardiology and infectious disease. Given his underlying chronic disease processes and his current appearance clinically, rabia discussion had been held with the family previously my examination and the family is requesting a DNR CC status. We will not escalate care further. His prognosis is poor. Goals of care will need to be further discussed moving forward. The palliative care team will provide consultation. Continue current therapy. See orders for further plan of care. More than 50% of my  time was spent at the bedside counseling/coordinating care with the patient and/or family with face to face contact. This time was spent reviewing notes and laboratory data as well as instructing and counseling the patient.  Time I spent with the family or surrogate(s) is included only if the patient was incapable of providing the necessary information or participating in medical

## 2021-08-25 NOTE — PROGRESS NOTES
Date:2021  Patient Name: Ishaan Arriola  MRN: [id number]  : 1935  ROOM #: [id number]    Physical Therapy order received, chart reviewed and evaluation attempted this date. 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 Will attempt PT evaluation at a later time. Thank you.    Unique Harding, PT, DPT

## 2021-08-25 NOTE — CONSULTS
Nephrology Consult Note  [facility]     Reason for Consult: CLINTON  Requesting Physician:  Dr Jan Lebron   Date of Service: 8/25/2021   Chief Complaint:  AMS  History Obtained From:  Medical record, nurse at bedside      History of Present Ilness:      Mr Afsaneh Nj is an 82year old male who we are consulted on for evaluation and management of acute kidney injury    He is seen and examined in his room. He is awake but confused and agitated - he does not answer questions or follow commands. He apparently presented to the hospital from [facility] with worsening confusion. He was recently at [facility] - will need to obtain records. He apparently had fall prior to presentation with prolonged immobilization. Other issues prior to presentation include hypotension and diarrhea - the details are not clear    He has had chronic kidney disease, though creatinine was less than 1.0 during recent [facility] admission. Creatinine was 2.2 on this admission.           Past Medical History:        Diagnosis Date    Anxiety     Atrial fibrillation (HCC)     COPD (chronic obstructive pulmonary disease) (HCC)     GERD (gastroesophageal reflux disease)     High cholesterol     Prostate CA (HCC)        Past Surgical History:        Procedure Laterality Date    CATARACT REMOVAL WITH IMPLANT      both eyes    COLONOSCOPY      KNEE ARTHROSCOPY      right knee    PROSTATE BIOPSY      TONSILLECTOMY         Current Medications:    Current Facility-Administered Medications: acetaminophen (TYLENOL) tablet 650 mg, 650 mg, Oral, Q6H PRN **OR** acetaminophen (TYLENOL) suppository 650 mg, 650 mg, Rectal, Q4H PRN  albuterol (PROVENTIL) nebulizer solution 2.5 mg, 2.5 mg, Nebulization, Q4H PRN  Arformoterol Tartrate (BROVANA) nebulizer solution 15 mcg, 15 mcg, Nebulization, BID  sodium chloride flush 0.9 % injection 5-40 mL, 5-40 mL, IntraVENous, 2 times per day  sodium chloride flush 0.9 % injection 5-40 mL, 5-40 mL, IntraVENous, PRN  0.9 % sodium chloride infusion, 25 mL, IntraVENous, PRN  heparin (porcine) injection 5,000 Units, 5,000 Units, Subcutaneous, 3 times per day  haloperidol lactate (HALDOL) injection 2 mg, 2 mg, Intramuscular, Q6H PRN  metoprolol succinate (TOPROL XL) extended release tablet 25 mg, 25 mg, Oral, Daily  perflutren lipid microspheres (DEFINITY) injection 1.65 mg, 1.5 mL, IntraVENous, ONCE PRN  sodium bicarbonate 75 mEq in dextrose 5 % 1,000 mL infusion, , IntraVENous, Continuous  lactobacillus (CULTURELLE) capsule 1 capsule, 1 capsule, Oral, Q12H  meropenem (MERREM) 500 mg IVPB (mini-bag), 500 mg, IntraVENous, Q12H  vancomycin (VANCOCIN) intermittent dosing (placeholder), , Other, RX Placeholder    Allergies:  Amoxicillin    Social History:    Unable to obtain due to medical condition     Family History:   Unable to obtain due to medical condition     Review of Systems:   Pertinent positives stated above in HPI. All other systems were reviewed and were negative.     Physical exam:   Constitutional:  VITALS:  /86   Pulse 105   Temp 99.1 °F (37.3 °C) (Axillary)   Resp 22   Ht 6' 2\" (1.88 m)   Wt 154 lb (69.9 kg)   SpO2 100%   BMI 19.77 kg/m²     Gen: awake, no acute distress  Eyes: anicteric sclerae   HEENT: atraumatic/normocephalic, dry mucus membranes  Neck: supple, no JVD  Lungs: equal lung expansion, poor effort   CV: tachycardic, irregular   Abdomen: soft, nontender, nondistended, normoactive bowel sounds  Extremitiy: no clubbing, cyanosis  No edema  : +reeves   Skin: no rash, tugor wnl  Neuro: no focal deficits but confused and limited by medical condition   Psych: limited by medical condition       Data:       Last 3 CMP:    Recent Labs     08/24/21  1110 08/24/21  2137 08/25/21  0500    146 142   K 5.9* 5.4* 5.0   * 113* 110*   CO2 19* 17* 18*   BUN 32* 33* 35*   CREATININE 2.2* 2.2* 2.3*   GLUCOSE 93 94 88   CALCIUM 5.9* 5.2* 5.3*   PROT 6.0*  --  5.0*   LABALBU 2.9*  --  2.2*   BILITOT 0.5  -- 0. 4   ALKPHOS 582*  --  666*   AST 69*  --  95*   ALT 18  --  15         Last 3 Glucose:     Recent Labs     08/24/21  1110 08/24/21 2137 08/25/21  0500   GLUCOSE 93 94 88         Last 3 POC Glucose:     No results for input(s): POCGLU in the last 72 hours. Last 3 CK, CKMB, Troponin  Recent Labs     08/24/21 2137   CKTOTAL 516*         Last 3 CBC:  Recent Labs     08/24/21  1110   WBC 10.6   RBC 2.85*   HGB 8.3*   HCT 26.9*  26.7*   MCV 93.7   MCH 29.1   MCHC 31.1*   RDW 22.6*      MPV 10.4       Last 3 Hepatic Function Panel:    Recent Labs     08/24/21  1110 08/25/21  0500   ALKPHOS 582* 666*   ALT 18 15   AST 69* 95*   PROT 6.0* 5.0*   BILITOT 0.5 0.4   BILIDIR  --  <0.2   LABALBU 2.9* 2.2*       Albumin:  Recent Labs     08/25/21  0500   LABALBU 2.2*       Calcium:  Recent Labs     08/25/21  0500   CALCIUM 5.3*       Ionized Calcium:  No results for input(s): IONCA in the last 72 hours. Magnesium:    Recent Labs     08/25/21  0500   MG 1.8         ABGs:  No results for input(s): PHART, PO2ART, SKN5OAP, ZHV6IZY, BEART, L7ORXLWO in the last 72 hours. Lactic Acid:  Recent Labs     08/25/21  0740   LACTA 1.9       Last 3 Amylase:  No results for input(s): AMYLASE in the last 72 hours. Last 3 Lipase:  Recent Labs     08/24/21  1110   LIPASE 28       Last 3 BNP:  No results for input(s): BNP in the last 72 hours. [address] [[id number]] Collected: 08/24/21 1620     Order Status: Completed Updated: 08/24/21 1625     Narrative:       EXAMINATION:   RIGHT UPPER QUADRANT ULTRASOUND     8/24/2021 3:58 pm     COMPARISON:   None. HISTORY:   ORDERING SYSTEM PROVIDED HISTORY: r/o cholecystitis   TECHNOLOGIST PROVIDED HISTORY:   Portable please   Reason for exam:->r/o cholecystitis   What reading provider will be dictating this exam?->CRC     FINDINGS:   LIVER:  The liver demonstrates normal echogenicity without evidence of   intrahepatic biliary ductal dilatation.      BILIARY SYSTEM:  Gallbladder sludge no gallbladder wall thickening or   pericholecystic fluid.  Negative sonographic Kaur's sign. Common bile duct is within normal limits measuring . RIGHT KIDNEY: The right kidney is atrophic measuring 8.9 cm without evidence   of hydronephrosis. PANCREAS:  Visualized portions of the pancreas are unremarkable. OTHER: No evidence of right upper quadrant ascites.  Right pleural effusion.      Impression:       Gallbladder sludge in an otherwise normal gallbladder.  Normal common bile   duct. Right pleural effusion.      XR HIP RIGHT (2-3 VIEWS) [[id number]] Collected: 08/24/21 1422     Order Status: Completed Updated: 08/24/21 1429     Narrative:       EXAMINATION:   TWO XRAY VIEWS OF THE RIGHT HIP     8/24/2021 12:47 pm     COMPARISON:   31 May 2019     HISTORY:   ORDERING SYSTEM PROVIDED HISTORY: right hip pain   TECHNOLOGIST PROVIDED HISTORY:   Reason for exam:->right hip pain     FINDINGS:   There is extensive sclerosis involving both iliac bones and both ischemia as   well as the right sacrum and L5 vertebra compatible with blastic osseous   metastatic disease.  There is severe osteoarthritis at the right hip.      Impression:       Extensive bony sclerosis compatible with blastic osseous metastatic disease. 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      XR CHEST 1 VIEW [[id number]] Collected: 08/24/21 1404     Order Status: Completed Updated: 08/24/21 1408     Narrative:       EXAMINATION:   ONE XRAY VIEW OF THE CHEST     8/24/2021 1:47 pm     COMPARISON:   None. HISTORY:   ORDERING SYSTEM PROVIDED HISTORY: shortness of breath   TECHNOLOGIST PROVIDED HISTORY:   Reason for exam:->shortness of breath     FINDINGS:   There is patchy opacification right lung which could represent a combination   of a pleural effusion and right lung airspace disease. There is a trace left pleural effusion     The heart is mildly enlarged.  There is a dual lead cardiac pacer on the left.   Impression:       1. Right lung airspace disease and right pleural effusion. 2. Trace left pleural effusion.      CT ABDOMEN PELVIS WO CONTRAST Additional Contrast? None [[id number]] Collected: 08/24/21 1347     Order Status: Completed Updated: 08/24/21 1351     Narrative:       EXAMINATION:   CT OF THE ABDOMEN AND PELVIS WITHOUT CONTRAST 8/24/2021 12:33 pm     TECHNIQUE:   CT of the abdomen and pelvis was performed without the administration of   intravenous contrast. Multiplanar reformatted images are provided for review. Dose modulation, iterative reconstruction, and/or weight based adjustment of   the mA/kV was utilized to reduce the radiation dose to as low as reasonably   achievable. COMPARISON:   June 13, 2019     HISTORY:   ORDERING SYSTEM PROVIDED HISTORY: fall, trauma, abd ttp   TECHNOLOGIST PROVIDED HISTORY:   Reason for exam:->fall, trauma, abd ttp   Additional Contrast?->None   Decision Support Exception - unselect if not a suspected or confirmed   emergency medical condition->Emergency Medical Condition (MA)     FINDINGS:   No bowel obstruction, free air, or free fluid.  Moderate amount of stool   throughout the colon.  The appendix is visualized and normal in the right   lower quadrant.  Bernal catheter is present.  Surgical changes at level of the   prostate gland.  No retroperitoneal lymphadenopathy.  No intrahepatic or   extrahepatic bile duct dilatation.  Hyperattenuating material located in the   gallbladder.  No evidence of acute pancreatitis.  Spleen is nonenlarged. There is no hydronephrosis.  No renal or ureter calculus.  Bilateral inguinal   hernias measuring up to 2.9 cm on the right and 3.7 cm on the left.  The   hernias have increased in size compared to prior from June 13, 2019. Generalized sclerosis involving the osseous structures.  No evidence of   lumbar compression fracture.      Impression:       1. Diffuse osseous metastases.    2. No acute process in the abdomen or pelvis. 3. Hyperattenuating material located in the gallbladder could suggest   hyperdense sludge.      CT CHEST WO CONTRAST [[id number]] Collected: 08/24/21 1342     Order Status: Completed Updated: 08/24/21 1350     Narrative:       EXAMINATION:   CT OF THE CHEST WITHOUT CONTRAST 8/24/2021 12:33 pm     TECHNIQUE:   CT of the chest was performed without the administration of intravenous   contrast. Multiplanar reformatted images are provided for review. Dose   modulation, iterative reconstruction, and/or weight based adjustment of the   mA/kV was utilized to reduce the radiation dose to as low as reasonably   achievable. COMPARISON:   January 9, 2020     HISTORY:   ORDERING SYSTEM PROVIDED HISTORY: fall, trauma, hypoxia   TECHNOLOGIST PROVIDED HISTORY:   Reason for exam:->fall, trauma, hypoxia   Decision Support Exception - unselect if not a suspected or confirmed   emergency medical condition->Emergency Medical Condition (MA)     FINDINGS:   New moderate bilateral pleural effusions.  Smooth interlobular septal   thickening throughout both lungs.  Mild cardiomegaly.  No pericardial   effusion.  No abnormal mediastinal fluid collections.  No mediastinal or   hilar lymphadenopathy.  Left pacemaker is present.  No pneumothorax.  Diffuse   bony sclerosis demonstrated.      Impression:       1. New moderate to large bilateral pleural effusions. 2. Interstitial prominence throughout both lungs suggesting interstitial   pulmonary edema. 3. Diffuse osseous metastases.      CT Cervical Spine WO Contrast [[id number]] Collected: 08/24/21 1307     Order Status: Completed Updated: 08/24/21 1311     Narrative:       EXAMINATION:   CT OF THE CERVICAL SPINE WITHOUT CONTRAST 8/24/2021 12:33 pm     TECHNIQUE:   CT of the cervical spine was performed without the administration of   intravenous contrast. Multiplanar reformatted images are provided for review.    Dose modulation, iterative reconstruction, and/or weight based adjustment of   the mA/kV was utilized to reduce the radiation dose to as low as reasonably   achievable. COMPARISON:   None. HISTORY:   ORDERING SYSTEM PROVIDED HISTORY: fall   TECHNOLOGIST PROVIDED HISTORY:   Reason for exam:->fall   Decision Support Exception - unselect if not a suspected or confirmed   emergency medical condition->Emergency Medical Condition (MA)     FINDINGS:   The ring of C1 is intact as is the dense.  There is no compression fracture   of the cervical spine.  No jumped or perched facet is noted. Multilevel degenerative disc and degenerative joint disease is noted. The prevertebral soft tissues are unremarkable.  The airway is widely patent. Images through the lung apices are negative for a pneumothorax.      Impression:       1. There is no acute compression fracture or subluxation of the cervical   spine. 2. Multilevel degenerative disc and degenerative joint disease.      CT Head WO Contrast [[id number]] Collected: 08/24/21 1256     Order Status: Completed Updated: 08/24/21 1300     Narrative:       EXAMINATION:   CT OF THE HEAD WITHOUT CONTRAST  8/24/2021 12:33 pm     TECHNIQUE:   CT of the head was performed without the administration of intravenous   contrast. Dose modulation, iterative reconstruction, and/or weight based   adjustment of the mA/kV was utilized to reduce the radiation dose to as low   as reasonably achievable. COMPARISON:   None. HISTORY:   ORDERING SYSTEM PROVIDED HISTORY: fall   TECHNOLOGIST PROVIDED HISTORY:   Reason for exam:->fall   Has a \"code stroke\" or \"stroke alert\" been called? ->No   Decision Support Exception - unselect if not a suspected or confirmed   emergency medical condition->Emergency Medical Condition (MA)     FINDINGS:   BRAIN/VENTRICLES: There is no acute intracranial hemorrhage, mass effect or   midline shift.  No abnormal extra-axial fluid collection.  The gray-white   differentiation is maintained without evidence of an acute infarct. [place] is   no evidence of hydrocephalus. Periventricular white matter changes consistent   chronic microvascular disease.  Diffuse volume loss. ORBITS: The visualized portion of the orbits demonstrate no acute abnormality. SINUSES: The visualized paranasal sinuses and mastoid air cells demonstrate   no acute abnormality. SOFT TISSUES/SKULL:  No acute abnormality of the visualized skull or soft   tissues.      Impression:       No acute intracranial abnormality. Periventricular white matter changes consistent chronic microvascular   disease.  Diffuse volume loss.           Assessment/Plan      1 Acute kidney injury with CKD IIIA  Baseline creatinine 0.9 to 1.2 with risk factors including prior AKIs and age  Acute worsening with creatinine 2.2 on admission in the setting of decreased intake, hypotension, possible sepsis, reported diarrhea  Work-up has included CT scan with no hydronephrosis, UA with no protein, >20 WBCs, 2-5 RBCs; urine eosinophils of 2% - though in the setting of likely UTI and with no peripheral eosinophilia; CPK only mildly elevated at 516    At this time, will follow renal function and urine output closely  Give IV fluids  Maintain MAP>65mmHg - had been on midodrine PTA  Renal function stable this AM      2 Hyperkalemia  With CLINTON and replacement at home as well as digoxin toxicity  Received medical treatment   Follow K and treat further as needed    3 Metabolic acidosis  With renal failure and lactic acidosis  On bicarbonate gtt  Lactic acid improved    4 Anemia  Hgb 8.3 on admission  No reported blood loss  With metastatic prostate cancer   As he is likely intravascularly volume depleted, his Hgb may decrease with volume resuscitation   Follow Hgb and transfuse as needed    5 Hypotension  With possible sepsis  Give IV fluids  On midodrine chronically    6 Hypophosphatemia  On replacement chronically  Follow PO4 and treat as needed    7 Hypocalcemia  With hypoalbuminemia   On calcitriol chronically  Check ionized calcium, PTH, vitamin D  Give calcium gluconate          Thank you for the opportunity to participate in the care of Mr Hilario Cox    ______________________________      Juanito Mena MD  8/25/2021  9:46 AM

## 2021-08-25 NOTE — PROGRESS NOTES
Acute Problems:   Chronic hypoxic respiratory failure  Urosepsis  Altered mental status  Bilateral pleural effusions  Pulmonary edema  COPD  Metastatic prostate cancer    Assessment and Plan:  -With IV fluid with close monitoring of respiratory status.  -Continue to evaluate for fluid overload  -Thoracentesis is not indicated at this time due to patient's lack of respiratory symptoms. Family is considering hospice.  -Hold off on steroids as patient does not appear to be in acute exacerbation of COPD  -Echo pending  -Cardiology consulted for possible CHF exacerbation  -Infectious disease following for urosepsis and managing antibiotics. History of Present Illness:   Patient is a 82 y.o. male with a past medical history of atrial fibrillation, metastatic prostate cancer with mets to the bone, COPD, former smoker, sick sinus syndrome with dual-chamber pacemaker who presented to the ED from his nursing home for hypotension and altered mental status. His daughter-in-law was at bedside upon my assessment and stated that he had been admitted to [facility] weeks ago for multiple falls while on anticoagulation. At that time he was discharged to a SNF where he continued to have altered mental status. He was discharged on 3 L nasal cannula. Of note he did have COVID-19 in December 2020 however was treated and discharged home. Daughter-in-law states he has had continued decline since that discharge and has become weak and had multiple falls. Our service has been consulted secondary to chronic respiratory failure with possible COPD exacerbation and bilateral pleural effusions with pulmonary edema. Unfortunately this patient is chronically and acutely ill and his fluid status is very tenuous. He does have a urinary tract infection with an acute kidney injury superimposed on chronic kidney disease with hyperkalemia. He appears to be acutely dehydrated and will need IV fluids.   This could be from exacerbation of CHF and an echo has been ordered for tomorrow. He does have metastatic prostate cancer with lung nodules seen in the past.  I would not recommend thoracentesis at this time as patient is acutely ill and only requiring baseline oxygen. Upon my assessment the patient is confused grasping for things in the air and unable to communicate his needs. Daily progress:  08/25/2021: Patient remains lethargic and poorly responsive. He has extensive tremor. I had an extensive discussion with the patient's daughter at the bedside who is considering hospice care and comfort care measures. Patient is currently on low-flow nasal cannula and not in any respiratory distress.       Past Medical History:  Past Medical History:   Diagnosis Date    Anxiety     Atrial fibrillation (HCC)     COPD (chronic obstructive pulmonary disease) (HCC)     GERD (gastroesophageal reflux disease)     High cholesterol     Prostate CA (HCC)         Family History:   Family History   Problem Relation Age of Onset    Cancer Mother     Cancer Father     Cancer Brother        Allergies:         Amoxicillin    Social history:  Social History     Socioeconomic History    Marital status:      Spouse name: Not on file    Number of children: Not on file    Years of education: Not on file    Highest education level: Not on file   Occupational History    Not on file   Tobacco Use    Smoking status: Former Smoker    Smokeless tobacco: Current User     Types: Chew   Vaping Use    Vaping Use: Never used   Substance and Sexual Activity    Alcohol use: Not Currently    Drug use: Never    Sexual activity: Not Currently   Other Topics Concern    Not on file   Social History Narrative    Not on file     Social Determinants of Health     Financial Resource Strain:     Difficulty of Paying Living Expenses:    Food Insecurity:     Worried About Running Out of Food in the Last Year:     [address] in the Last Year: Transportation Needs:     Lack of Transportation (Medical):      Lack of Transportation (Non-Medical):    Physical Activity:     Days of Exercise per Week:     Minutes of Exercise per Session:    Stress:     Feeling of Stress :    Social Connections:     Frequency of Communication with Friends and Family:     Frequency of Social Gatherings with Friends and Family:     Attends [employer] Services:     Active Member of Clubs or Organizations:     Attends Club or Organization Meetings:     Marital Status:    Intimate Partner Violence:     Fear of Current or Ex-Partner:     Emotionally Abused:     Physically Abused:     Sexually Abused:        Current Medications:     Current Facility-Administered Medications:     acetaminophen (TYLENOL) tablet 650 mg, 650 mg, Oral, Q6H PRN **OR** acetaminophen (TYLENOL) suppository 650 mg, 650 mg, Rectal, Q4H PRN, Nelson Call, DO, 650 mg at 08/25/21 1158    [START ON 8/26/2021] metoprolol succinate (TOPROL XL) extended release tablet 50 mg, 50 mg, Oral, Daily, Ronne Red, APRN - CNP    albuterol (PROVENTIL) nebulizer solution 2.5 mg, 2.5 mg, Nebulization, Q4H PRN, Ronne Red, APRN - CNP    Arformoterol Tartrate (BROVANA) nebulizer solution 15 mcg, 15 mcg, Nebulization, BID, Ronne Red, APRN - CNP, 15 mcg at 08/24/21 1658    sodium chloride flush 0.9 % injection 5-40 mL, 5-40 mL, IntraVENous, 2 times per day, Ronne Red, APRN - CNP, 10 mL at 08/25/21 0900    sodium chloride flush 0.9 % injection 5-40 mL, 5-40 mL, IntraVENous, PRN, Ronne Red, APRN - CNP    0.9 % sodium chloride infusion, 25 mL, IntraVENous, PRN, Ronne Red, APRN - CNP, Last Rate: 100 mL/hr at 08/25/21 1048, 25 mL at 08/25/21 1048    heparin (porcine) injection 5,000 Units, 5,000 Units, Subcutaneous, 3 times per day, Ronne Red, APRN - CNP, 5,000 Units at 08/25/21 0521    haloperidol lactate (HALDOL) injection 2 mg, 2 mg, Intramuscular, Q6H PRN, Ronne Red, APRN - CNP, 2 mg at 08/25/21 0924    perflutren lipid microspheres (DEFINITY) injection 1.65 mg, 1.5 mL, IntraVENous, ONCE PRN, Albino Boxer, APRN - CNP    sodium bicarbonate 75 mEq in dextrose 5 % 1,000 mL infusion, , IntraVENous, Continuous, Albino Boxer, APRN - CNP, Last Rate: 75 mL/hr at 08/25/21 1142, New Bag at 08/25/21 1142    lactobacillus (CULTURELLE) capsule 1 capsule, 1 capsule, Oral, Q12H, Claudette Crazier, MD, 1 capsule at 08/25/21 0905    meropenem (MERREM) 500 mg IVPB (mini-bag), 500 mg, IntraVENous, Q12H, Claudette Crazier, MD, Stopped at 08/25/21 1045    vancomycin (VANCOCIN) intermittent dosing (placeholder), , Other, RX Placeholder, Albino Boxer, APRN - CNP    Review of Systems:   Unable to perform review of systems secondary to altered mental status    Physical Exam:   Vital Signs:  BP (!) 119/92   Pulse 85   Temp 101.7 °F (38.7 °C) (Axillary)   Resp 22   Ht 6' 2\" (1.88 m)   Wt 154 lb (69.9 kg)   SpO2 99%   BMI 19.77 kg/m²     Input/Output: In: 0   Out: 550     Oxygen requirements: 3LNC    Ventilator Information:  Vent Information  SpO2: 99 %    General appearance: Cachectic, in no respiratory distress    HEENT: normocephalic, ELIJAH, pharynx clear, dry mucosa, redness of the uvula appreciated,   Neck: Supple, no jugular venous distension, lymphadenopathy, thyromegaly or carotid bruits  Chest: Equal normal breath sounds, no wheezing, no crackles and no tenderness over ribs   Cardiovascular: Normal S1 , S2, no murmur, rub or gallop  Abdomen: Normal sounds present, soft, lax with no tenderness, no hepatosplenomegaly, and no masses  Extremities: No edema. Pulses are equally present.    Skin: intact, no rashes   Neurologic: Confused, hallucinating, PERRLA, +ve tremor     Investigations:  Labs, radiological imaging and cardiac work up were reviewed         Time: 32 minutes      Electronically signed by Leonora Joyner MD on 8/25/2021 at 1:03 PM

## 2021-08-25 NOTE — CONSULTS
Acute Problems:   Chronic hypoxic respiratory failure  Urosepsis  Altered mental status  Bilateral pleural effusions  Pulmonary edema  COPD  Metastatic prostate cancer    Assessment and Plan:  -Agree with IV hydration secondary to sepsis and acute kidney injury, hydration status is tenuous patient does appear to be volume depleted  -Continue to evaluate for fluid overload  -Thoracentesis not recommended at this time  -Hold off on steroids as patient does not appear to be in acute exacerbation of COPD  -Echo pending for tomorrow  -Cardiology consulted for possible CHF exacerbation  -Infectious disease following for urosepsis    History of Present Illness:   Patient is a 82 y.o. male with a past medical history of atrial fibrillation, metastatic prostate cancer with mets to the bone, COPD, former smoker, sick sinus syndrome with dual-chamber pacemaker who presented to the ED from his nursing home for hypotension and altered mental status. His daughter-in-law was at bedside upon my assessment and stated that he had been admitted to [facility] 3 weeks ago for multiple falls while on anticoagulation. At that time he was discharged to a SNF where he continued to have altered mental status. He was discharged on 3 L nasal cannula. Of note he did have COVID-19 in December 2020 however was treated and discharged home. Daughter-in-law states he has had continued decline since that discharge and has become weak and had multiple falls. Our service has been consulted secondary to chronic respiratory failure with possible COPD exacerbation and bilateral pleural effusions with pulmonary edema. Unfortunately this patient is chronically and acutely ill and his fluid status is very tenuous. He does have a urinary tract infection with an acute kidney injury superimposed on chronic kidney disease with hyperkalemia. He appears to be acutely dehydrated and will need IV fluids.   This could be from exacerbation of CHF and an echo has been ordered for tomorrow. He does have metastatic prostate cancer with lung nodules seen in the past.  I would not recommend thoracentesis at this time as patient is acutely ill and only requiring baseline oxygen. Upon my assessment the patient is confused grasping for things in the air and unable to communicate his needs.       Past Medical History:  Past Medical History:   Diagnosis Date    Anxiety     Atrial fibrillation (HCC)     COPD (chronic obstructive pulmonary disease) (HCC)     GERD (gastroesophageal reflux disease)     High cholesterol     Prostate CA (HCC)         Family History:   Family History   Problem Relation Age of Onset    Cancer Mother     Cancer Father     Cancer Brother        Allergies:         Amoxicillin    Social history:  Social History     Socioeconomic History    Marital status:      Spouse name: Not on file    Number of children: Not on file    Years of education: Not on file    Highest education level: Not on file   Occupational History    Not on file   Tobacco Use    Smoking status: Former Smoker    Smokeless tobacco: Current User     Types: Chew   Vaping Use    Vaping Use: Never used   Substance and Sexual Activity    Alcohol use: Not Currently    Drug use: Never    Sexual activity: Not Currently   Other Topics Concern    Not on file   Social History Narrative    Not on file     Social Determinants of Health     Financial Resource Strain:     Difficulty of Paying Living Expenses:    Food Insecurity:     Worried About Running Out of Food in the Last Year:     [address] in the Last Year:    Transportation Needs:     Lack of Transportation (Medical):     Lack of Transportation (Non-Medical):    Physical Activity:     Days of Exercise per Week:     Minutes of Exercise per Session:    Stress:     Feeling of Stress :    Social Connections:     Frequency of Communication with Friends and Family:     Frequency of Social Gatherings with Friends and Family: Attends [employer]onden[employer]ational Services:      Active Member of Clubs or Organizations:     Attends Club or Organization Meetings:     Marital Status:    Intimate Partner Violence:     Fear of Current or Ex-Partner:     Emotionally Abused:     Physically Abused:     Sexually Abused:        Current Medications:     Current Facility-Administered Medications:     albuterol (PROVENTIL) nebulizer solution 2.5 mg, 2.5 mg, Nebulization, Q4H PRN, VERONA Toscano CNP    Arformoterol Tartrate (BROVANA) nebulizer solution 15 mcg, 15 mcg, Nebulization, BID, VERONA Toscano - CNP, 15 mcg at 08/24/21 1658    sodium chloride flush 0.9 % injection 5-40 mL, 5-40 mL, IntraVENous, 2 times per day, VERONA Toscano - CNP, 10 mL at 08/25/21 0112    sodium chloride flush 0.9 % injection 5-40 mL, 5-40 mL, IntraVENous, PRN, VERONA Toscano - CNP    0.9 % sodium chloride infusion, 25 mL, IntraVENous, PRN, VERONA Toscano CNP    acetaminophen (TYLENOL) tablet 650 mg, 650 mg, Oral, Q6H PRN **OR** acetaminophen (TYLENOL) suppository 650 mg, 650 mg, Rectal, Q6H PRN, VERONA Toscano - CNP, 650 mg at 08/25/21 0042    heparin (porcine) injection 5,000 Units, 5,000 Units, Subcutaneous, 3 times per day, VERONA Toscano - CNP, 5,000 Units at 08/24/21 2018    haloperidol lactate (HALDOL) injection 2 mg, 2 mg, Intramuscular, Q6H PRN, VERONA Toscano - CNP, 2 mg at 08/24/21 2043    metoprolol succinate (TOPROL XL) extended release tablet 25 mg, 25 mg, Oral, Daily, VERONA Toscano CNP    perflutren lipid microspheres (DEFINITY) injection 1.65 mg, 1.5 mL, IntraVENous, ONCE PRN, VERONA Toscano CNP    sodium bicarbonate 75 mEq in dextrose 5 % 1,000 mL infusion, , IntraVENous, Continuous, VERONA Toscano CNP, Last Rate: 75 mL/hr at 08/24/21 2011, New Bag at 08/24/21 2011    lactobacillus (CULTURELLE) capsule 1 capsule, 1 capsule, Oral, Q12H, Saintclair Passe, MD    meropenem (MERREM) 500 mg IVPB (mini-bag), 500 mg, IntraVENous, Q12H, Ulysses Sang, MD, Stopped at 08/25/21 0007    vancomycin (VANCOCIN) intermittent dosing (placeholder), , Other, RX Placeholder, VERONA Deras CNP    calcium gluconate 2,000 mg in dextrose 5 % 100 mL IVPB, 2,000 mg, IntraVENous, Once, Chago Pavelus, DO    Review of Systems:   Unable to perform review of systems secondary to altered mental status    Physical Exam:   Vital Signs:  BP (!) 120/98   Pulse 102   Temp 101 °F (38.3 °C) (Axillary)   Resp (!) 35   Ht 6' 2\" (1.88 m)   Wt 154 lb (69.9 kg)   SpO2 98%   BMI 19.77 kg/m²     Input/Output:  No intake/output data recorded. Oxygen requirements: 3LNC    Ventilator Information:  Vent Information  SpO2: 98 %    General appearance: Cachectic, in no respiratory distress    HEENT: normocephalic, ELIJAH, pharynx clear, dry mucosa, redness of the uvula appreciated,   Neck: Supple, no jugular venous distension, lymphadenopathy, thyromegaly or carotid bruits  Chest: Equal normal breath sounds, no wheezing, no crackles and no tenderness over ribs   Cardiovascular: Normal S1 , S2, no murmur, rub or gallop  Abdomen: Normal sounds present, soft, lax with no tenderness, no hepatosplenomegaly, and no masses  Extremities: No edema. Pulses are equally present.    Skin: intact, no rashes   Neurologic: Confused, hallucinating, PERRLA     Investigations:  Labs, radiological imaging and cardiac work up were reviewed         Time: 32 minutes      Electronically signed by VERONA Styles CNP on 8/25/2021 at 1:25 AM    Patient was evaluated on August 25, 2021 at 12:30 PM.  I agree with initial assessment and plan of Jackie Tong

## 2021-08-25 NOTE — PROGRESS NOTES
SPEECH/LANGUAGE PATHOLOGY  CLINICAL ASSESSMENT OF SWALLOWING FUNCTION   and PLAN OF CARE    PATIENT NAME:  George Davey  (male)     MRN:  [id number]    :  1935  (82 y.o.)  STATUS:  Inpatient: Room 0513/0513-01    TODAY'S DATE:  2021  REFERRING PROVIDER:    Speech language pathology evaluation Start: 21 1400, End: 21 1400, ONE TIME, Standing Count: 1 Occurrences, R    Andrew Pale, APRN - CNP  REASON FOR REFERRAL: altered mental status    EVALUATING THERAPIST: RA Palacios                 RESULTS:    DYSPHAGIA DIAGNOSIS:   Clinical indicators of severe  oral phase dysphagia unable to determine degree of pharyngeal impairment       DIET RECOMMENDATIONS:  NPO       FEEDING RECOMMENDATIONS:     Assistance level:  Not applicable      Compensatory strategies recommended: Not applicable      Discussed recommendations with nursing and/or faxed report to referring provider: Yes    SPEECH THERAPY  PLAN OF CARE   The dysphagia POC is established based on physician order, dysphagia diagnosis and results of clinical assessment     Skilled SLP intervention for dysphagia management on acute care 3-5 x per week until goals met, pt plateaus in function and/or discharged from hospital    Conditions Requiring Skilled Therapeutic Intervention for dysphagia:    Patient is performing below his functional baseline d/t his current acute condition, Multiple diagnoses, multiple medications, and increased dependency upon caregivers.     No oral awareness with spoon presentation    Specific dysphagia interventions to include:     ongoing skilled PO analysis to determine if PO diet can be initiated       Specific instructions for next treatment:  ongoing skilled PO analysis to determine if PO diet can be initiated   Patient Treatment Goals:    Short Term Goals:  Pt will participate in ongoing evaluation of swallow function to determine when PO diet can be safely initiated    Long Term Goals:   Pt will improve oropharyngeal swallow function to ensure airway protection during PO intake to maintain adequate nutrition/hydration and decrease signs/symptoms of aspiration to less than 1 x/day.       Patient/family Goal:    To be able to eat/drink again per family     Plan of care discussed with Family   The Family understand(s) the diagnosis, prognosis and plan of care     Rehabilitation Potential/Prognosis: fair                    ADMITTING DIAGNOSIS: Hyperkalemia [E87.5]  [place] (acute kidney injury) ([facility] [N17.9]  UTI (urinary tract infection), bacterial [N39.0, A49.9]  Severe sepsis with acute organ dysfunction ([facility] [A41.9, R65.20]  Altered mental status, unspecified altered mental status type [R41.82]  Sepsis, due to unspecified organism, unspecified whether acute organ dysfunction present ([facility] [A41.9]    VISIT DIAGNOSIS:   Visit Diagnoses       Codes    Sepsis, due to unspecified organism, unspecified whether acute organ dysfunction present ([facility]    -  Primary A41.9    Altered mental status, unspecified altered mental status type     R41.82    CLINTON (acute kidney injury) ([facility]     N17.9    Hyperkalemia     E87.5    UTI (urinary tract infection), bacterial     N39.0, A49.9           PATIENT REPORT/COMPLAINT: family reports poor intake recently but no specific choking episodes     RN cleared patient for participation in assessment     yes     PRIOR LEVEL OF SWALLOW FUNCTION:    PAST HISTORY OF DYSPHAGIA?: none reported    Home diet: Regular consistency solids with  thin liquids per nursing home documentation   Diet during hospital admission: NPO     PROCEDURE:  Consistencies Administered During the Evaluation   Liquids: thin liquid and pudding thick liquid   Solids:  pureed foods      Method of Intake:   Spoon--coated spoon only   Fed by clinician      Position:   Seated, upright    CLINICAL ASSESSMENT:  Oral Stage:       no oral awareness demonstrated to spoon presentation despite max stimulation presented       Pharyngeal Stage:    Absent swallow    Cognition:   Did not follow commands, did not attempt to verbalize during session     Oral Peripheral Examination   Generalized oral weakness    Current Respiratory Status    2 liters nasal cannula     Parameters of Speech Production  Respiration:  Adequate for speech production  Quality:   Within functional limits  Intensity: Within functional limits    Volitional Swallow: absent     Volitional Cough:   absent     Pain: No pain reported. EDUCATION:   The Speech Language Pathologist (SLP) completed education regarding results of evaluation and that intervention is warranted at this time. Learner: Patient and Family  Education: Reviewed results and recommendations of this evaluation  Evaluation of Education:  Laura Gray understanding    This plan may be re-evaluated and revised as warranted. Evaluation Time includes thorough review of current medical information, gathering information on past medical history/social history and prior level of function, completion of standardized testing/informal observation of tasks, assessment of data and education on plan of care and goals. [x]The admitting diagnosis and active problem list, have been reviewed prior to initiation of this evaluation.         ACTIVE PROBLEM LIST:   Patient Active Problem List   Diagnosis    Severe sepsis with acute organ dysfunction [facility])         CPT code:  06553  bedside swallow ashlyn Galvez MSCCC/SLP  Speech Language Pathologist  [id number]

## 2021-08-25 NOTE — PROGRESS NOTES
Pharmacy Consultation Note  (Antibiotic Dosing and Monitoring)    Initial consult date: 2021   Consulting physician: SIENA Paige  Drug(s): Vancomycin   Indication: Catheter acquired UTI    Ht Readings from Last 1 Encounters:   21 6' 2\" (1.88 m)     Wt Readings from Last 1 Encounters:   21 154 lb (69.9 kg)         Age/  Gender IBW DW  Allergy Information   82 y.o.     male 77.6 kg 67.7 kg  Amoxicillin                 Date  WBC BUN/CR UOP Drug/Dose Time   Given Level(s)   (Time) Comments     (#1) 10.6 32/2.2 -- Vancomycin 1000 mg IV x 1   (#2) -- 35/2.3 -- Vancomycin 1000 mg IV x 1 <1500> 11.0 mcg/mL @ 0500      (#3)            (#4)            (#5)            (#6)            (#7)            Estimated Creatinine Clearance: 24 mL/min (A) (based on SCr of 2.3 mg/dL (H)). UOP over the past 24 hours:       Intake/Output Summary (Last 24 hours) at 2021 1414  Last data filed at 2021 1040  Gross per 24 hour   Intake 0 ml   Output 550 ml   Net -550 ml       Temp max: Temp (24hrs), Av.8 °F (37.7 °C), Min:97.6 °F (36.4 °C), Max:101.7 °F (38.7 °C)      Antibiotic Regimen:  Antibiotic Dose Date Initiated   Ceftriaxone 1 g IV x 1    Meropenem 500 mg IV Q12H      Cultures:  available culture and sensitivity results were reviewed in EPIC  Cultures sent and are pending.   Culture Date Result    Blood cx # 1     Blood cx # 2     Urine cx       Assessment:  · Consulted by VERONA Quiñones CNP to dose/monitor vancomycin  · Goal trough level:  15-20 mcg/mL, AUC/ZEYAD: 400-600  · Pt is a 83 y/o male who presented  with altered mental status and severe sepsis with end organ dysfunction secondary to catheter acquired UTI  · Serum creatinine today: 2.3; CrCl ~ 24 mL/min; baseline Scr ~ 1.1 mg/dL  · : Random AM level = 11.0 mcg/mL      Plan:  · Vancomycin 1000 mg IV x 1  · Dose by levels d/t ARF, random level tomorrow morning  · Follow renal function · Pharmacist will follow and monitor/adjust dosing as necessary      Thank you for the consult,    Mariam Johnson, PharmD, BCPS 8/25/2021 2:15 PM   [phone]

## 2021-08-25 NOTE — CONSULTS
Palliative Care Department  [phone]  Palliative Care Initial Consult  Provider VERONA Meier CNP, AGACNP-BC    Danni Bacon  [id number]  Hospital Day: 2  Date of Initial Consult: 08/25/2021  Referring Provider: VERONA Dykes CNP  Palliative Medicine was consulted for assistance with: Goals of Care, Code Status Discussion, and Family Support    HPI:   Danni Bacon is a 82 y.o. with a past medical history of anxiety, Bradyarrhythmia s/p dual-chamber pacemaker, paroxsymal atrial fibrillation on Eliquis, Hypotension, COPD, HLD, prostate CA, COVID-19 who was admitted on 8/24/2021 from [facility] rehab facility with a [address] [place] [address] of [facility] . ASSESSMENT/PLAN:     Pertinent Hospital Diagnoses      Severe sepsis- ID, IVF, antibiotics, cultures    R pleural effusion    Urinary retention- indwelling reeves   Prostate CA history- CT scan shows osseous metastasis        Palliative Care Encounter / Counseling Regarding Goals of Care  Please see detailed goals of care discussion as below   At this time, Danni Bacon, Does Not have capacity for medical decision-making. Capacity is time limited and situation/question specific   During encounter Anthony Park was surrogate medical decision-maker   Outcome of goals of care meeting:choice for hospice consult   Code status DNR-CCA   Advanced Directives: no POA or living will in [facility]   Surrogate/Legal NOK:    Stevan Gonzales [phone]  Davis Christensen child (POA) [phone]    Spiritual assessment: no spiritual distress identified  Bereavement and grief: to be determined  Referrals to: Hospice  SUBJECTIVE:     Current medical issues leading to Palliative Medicine involvement include   Active Hospital Problems    Diagnosis Date Noted    Severe sepsis with acute organ dysfunction ([facility] [A41.9, R65.20] 08/24/2021     Details of Conversation: Reviewed patient's chart, reviewed the patient was staff.   Saw patient at the bedside, and spoke with daughter-in-law Deepa Richards. Patient with tremors at this time, not able to speak to me, but able to open his eyes slightly. Palliative care has been consulted to help with goals of care, family support, and CODE STATUS. Patient's wife Karol Pagan arrived at the hospital and treated places with daughter-in-law. Introduced myself and palliative care, contact information provided. Went over patient's current health status, and previous health status. Patient has had tremors over the last 4 to 5 months according to Karol Pagan, but not to the intensity they are at this time. Went over [address] levels in great detail. Karol Pagan was worried that her  was going to be resuscitated if his heart stopped. I assured her that a DNR CCA meant that he will receive treatment up to point of cardiac or respiratory arrest.  At that point, comfort measures would become the main focus. Outlined DNR pamphlet given to patient's wife. Talked about hospice with patient's wife, previously with patient's daughter-in-law, and with patient's daughter Arun Ellsworth. Hospice consult to be placed at this time, social work to choice tomorrow. Patient has had previous falls prior to the one at rehab.      OBJECTIVE:   Prognosis: unknown and Guarded    Physical Exam:  BP (!) 119/92   Pulse 85   Temp 101.7 °F (38.7 °C) (Axillary)   Resp 22   Ht 6' 2\" (1.88 m)   Wt 154 lb (69.9 kg)   SpO2 99%   BMI 19.77 kg/m²   Constitutional:  Elderly, thin, NAD  Eyes: no scleral icterus, normal lids, no discharge, cataract /implants  ENMT:  Normocephalic, atraumatic, mucosa moist, EOMI  Neck:  trachea midline, no JVD  Lungs:  Diminished bilaterally, no audible rhonchi or wheezes noted, respirations unlabored, no retractions, NC  Heart[de-identified]  RRR, distant heart tones, no murmur, rub, or gallop noted during exam, Pacer  Abd:  Soft, non tender, non distended, bowel sounds present  :  Catheter  MSK: sarcopenia present  Ext:  Moving all extremities, no edema, pulses present  Skin:  Warm and dry,skin tears, see wound documentation    Psych: agitated   Neuro: tremors, restless     Objective data reviewed: labs, images, records, medication use, vitals and chart    Discussed patient and the plan of care with the other IDT members: Palliative Medicine IDT Team, Primary Team, Floor Nurse, Patient and Family    Time/Communication  Greater than 50% of time spent, total 70 minutes in counseling and coordination of care at the bedside regarding Code status discussion, family support and goals of care. Thank you for allowing Palliative Medicine to participate in the care of Brad Pavon. Note: This report was completed using computerValetAnywhere voiced recognition software. Every effort has been made to ensure accuracy; however, inadvertent computerized transcription errors may be present.

## 2021-08-25 NOTE — ED NOTES
Dr. Darius Gupta notified of critical values he states that he will look at them himself in the chart.       Apurva Lentz RN  08/24/21 7372

## 2021-08-25 NOTE — PROGRESS NOTES
[address] [facility]  [facility]  Progress Note    NAME: Adan Vazquez  MR:  [id number]  :   1935  DATE OF SERVICE:21    This is a face to face encounter with Adan Vazquez 82 y.o. male on 21    CHIEF COMPLAINT     ID following for   Chief Complaint   Patient presents with    Altered Mental Status     fall on saturday, hematoma to R eye, eliquis held since 8/10 alert to self, normally a&ox4, normally RA on 5 L for O2 sat 85%     HISTORY OF PRESENT ILLNESS   Pt seen and examined  21   STILL WITH FEVERS [id number]  No family  present    agitated  Patient is tolerating medications. No reported adverse drug reactions. REVIEW OF SYSTEMS     As stated above in the chief complaint, otherwise negative. CURRENT MEDICATIONS      [START ON 2021] metoprolol succinate  50 mg Oral Daily    Arformoterol Tartrate  15 mcg Nebulization BID    sodium chloride flush  5-40 mL IntraVENous 2 times per day    heparin (porcine)  5,000 Units Subcutaneous 3 times per day    lactobacillus  1 capsule Oral Q12H    meropenem  500 mg IntraVENous Q12H    vancomycin (VANCOCIN) intermittent dosing (placeholder)   Other RX Placeholder     Continuous Infusions:   sodium chloride 25 mL (21 1048)    IV infusion builder 75 mL/hr at 21 1142     PRN Meds:acetaminophen **OR** acetaminophen, albuterol, sodium chloride flush, sodium chloride, haloperidol lactate, perflutren lipid microspheres    PHYSICAL EXAM     BP (!) 120/102   Pulse 90   Temp 100.8 °F (38.2 °C) (Axillary)   Resp 18   Ht 6' 2\" (1.88 m)   Wt 154 lb (69.9 kg)   SpO2 98%   BMI 19.77 kg/m²   Temp  Av.9 °F (37.7 °C)  Min: 97.6 °F (36.4 °C)  Max: 101.7 °F (38.7 °C)  Constitutional:  The patient is awake, alert, and oriented. thin. No rashes were noted. HEENT:   Round and reactive pupils. AT/NC  Neck:    Supple to movements. Chest:   No use of accessory muscles to breathe. Symmetrical expansion. Cardiovascular:  S1 and S2 are rhythmic and regular. No murmurs appreciated. Abdomen:   Positive bowel sounds to auscultation. Benign to palpation. Extremities:   No clubbing, no cyanosis, no edema.   CNS    agiatated  Lines: piv      DIAGNOSTIC RESULTS   Radiology:    Recent Labs     08/24/21  1110 08/25/21  1352   WBC 10.6 7.6   RBC 2.85* 2.37*   HGB 8.3* 6.8*   HCT 26.9*  26.7* 22.0*   MCV 93.7 92.8   MCH 29.1 28.7   MCHC 31.1* 30.9*   RDW 22.6* 22.9*    152   MPV 10.4 10.9     Recent Labs     08/24/21  1110 08/24/21  2137 08/25/21  0500    146 142   K 5.9* 5.4* 5.0   * 113* 110*   CO2 19* 17* 18*   BUN 32* 33* 35*   CREATININE 2.2* 2.2* 2.3*   GLUCOSE 93 94 88   PROT 6.0*  --  5.0*   LABALBU 2.9*  --  2.2*   CALCIUM 5.9* 5.2* 5.3*   BILITOT 0.5  --  0.4   ALKPHOS 582*  --  666*   AST 69*  --  95*   ALT 18  --  15     No results found for: CRP  No results found for: SEDRATE  Recent Labs     08/24/21  1110 08/25/21  0500   FERRITIN 8,601 9,012   INR 1.4  --    PROTIME 16.1*  --    AST 69* 95*   ALT 18 15   TRIG  --  87     Lab Results   Component Value Date    CHOL 69 08/25/2021    TRIG 87 08/25/2021    HDL 24 08/25/2021    LDLCALC 28 08/25/2021    LABVLDL 17 08/25/2021     Lab Results   Component Value Date/Time    VITD25 29 (L) 10/11/2018 10:00 AM       Microbiology:   Recent Labs     08/24/21  1450   COVID19 Not Detected     Lab Results   Component Value Date    BC 24 Hours no growth 08/24/2021    BLOODCULT2 24 Hours no growth 08/24/2021    ORG Gram negative jesus manuel 08/24/2021       Recent Labs     08/24/21  1145   ORG Gram negative jesus manuel*     Recent Labs     08/24/21  1145   ORG Gram negative jesus manuel*         FINAL IMPRESSION    FEVERS  UTI GN  Gallbladder sludge     lactobacillus (CULTURELLE) capsule 1 capsule, Q12H  meropenem (MERREM) 500 mg IVPB (mini-bag), Q12H  vancomycin (VANCOCIN) intermittent dosing (placeholder), RX Placeholder    For hospice     · Monitor labs    Imaging and labs were reviewed per medical records. Thank you for involving me in the care of [address] Cummings will continue to follow. Please do not hesitate to call for any questions or concerns.     Electronically signed by Christen Baez MD on 8/25/2021 at 6:48 PM

## 2021-08-26 NOTE — PROGRESS NOTES
EMS here to transport pt to hospice house. Tele removed and brought back to nurses station. Family at bedside states that they have all of the pt. Personal items and that they will follow ambulance to hospice house.

## 2021-08-26 NOTE — PROGRESS NOTES
Palliative Care Department  [phone]  Palliative Care Progress Note  Provider Kadeem Tipton, [address]  [id number]  Hospital Day: 3  Date of Initial Consult: 08/25/2021  Referring Provider: VERONA Barrientos CNP  Palliative Medicine was consulted for assistance with: Goals of Care, Code Status Discussion, and Family Support    HPI:   Enrique Chavez is a 82 y.o. with a past medical history of anxiety, Bradyarrhythmia s/p dual-chamber pacemaker, paroxsymal atrial fibrillation on Eliquis, Hypotension, COPD, HLD, prostate CA, COVID-19 who was admitted on 8/24/2021 from [facility] rehab facility with a [address] [place] [address] of [facility] . ASSESSMENT/PLAN:     Pertinent Hospital Diagnoses      Severe sepsis- ID, IVF, antibiotics, cultures    R pleural effusion    Urinary retention- indwelling reeves   Prostate CA history- CT scan shows osseous metastasis        Palliative Care Encounter / Counseling Regarding Goals of Care  Please see detailed goals of care discussion as below   At this time, Enrique Chavez, Does Not have capacity for medical decision-making. Capacity is time limited and situation/question specific   During encounter Angel Suggs was surrogate medical decision-maker   Outcome of goals of care meeting: to [facility], pickup being arranged by hospice liaison   Code status DNR-CC   Advanced Directives: no POA or living will in [facility]   Surrogate/Legal NOK:    Tapan Pastrana [phone]  Saleem Mijares child (POA) [phone]    Spiritual assessment: no spiritual distress identified  Bereavement and grief: to be determined  Referrals to: Hospice  SUBJECTIVE:     Details of Conversation: Reviewed patient's chart, spoke with hospice liaison. Plan for transfer to [facility] this afternoon, patient dyspneic, tachycardic, increased work of breathing. Comfort Rx ordered.     OBJECTIVE:   Prognosis: unknown and Guarded    Physical Exam:  BP (!) 153/110   Pulse 83   Temp 98.9 °F (37.2 °C) (Axillary)   Resp 22   Ht 6' 2\" (1.88 m)   Wt 157 lb (71.2 kg)   SpO2 93%   BMI 20.16 kg/m²   Constitutional:  Elderly, cachectic,  NAD, appears toxic and in respiratory distress  Eyes: no scleral icterus, normal lids, no discharge, cataract /implants  ENMT:  Normocephalic, atraumatic, mucosa moist, EOMI  Neck:  trachea midline, no JVD  Lungs: Tachypneic, retractions, greatly increased work of breathing  Heart[de-identified]  RRR, tachycardic  Abd:  Soft, non tender, non distended, bowel sounds present  :  Catheter  MSK: sarcopenia present  Ext:  Moving all extremities, no edema, pulses present  Skin:  [place] Spotted, diaphoretic    Psych: agitated and anxious appearance  Neuro: tremors, restless     Objective data reviewed: labs, images, records, medication use, vitals and chart    Discussed patient and the plan of care with the other IDT members: Palliative Medicine IDT Team, Primary Team, Floor Nurse, Patient and Family    Time/Communication  Greater than 50% of time spent, total 15 minutes in counseling and coordination of care at the bedside regarding Code status discussion, family support and goals of care. Thank you for allowing Palliative Medicine to participate in the care of Brian Trevino.

## 2021-08-26 NOTE — PLAN OF CARE
Problem: Falls - Risk of:  Goal: Will remain free from falls  Description: Will remain free from falls  Outcome: Met This Shift  Goal: Absence of physical injury  Description: Absence of physical injury  Outcome: Met This Shift     Problem: Skin Integrity:  Goal: Will show no infection signs and symptoms  Description: Will show no infection signs and symptoms  Outcome: Met This Shift  Goal: Absence of new skin breakdown  Description: Absence of new skin breakdown  Outcome: Met This Shift     Problem: Confusion - Acute:  Goal: Absence of continued neurological deterioration signs and symptoms  Description: Absence of continued neurological deterioration signs and symptoms  Outcome: Met This Shift  Goal: Mental status will be restored to baseline  Description: Mental status will be restored to baseline  Outcome: Met This Shift     Problem: Discharge Planning:  Goal: Ability to perform activities of daily living will improve  Description: Ability to perform activities of daily living will improve  Outcome: Met This Shift  Goal: Participates in care planning  Description: Participates in care planning  Outcome: Met This Shift     Problem: Injury - Risk of, Physical Injury:  Goal: Will remain free from falls  Description: Will remain free from falls  Outcome: Met This Shift  Goal: Absence of physical injury  Description: Absence of physical injury  Outcome: Met This Shift     Problem: Mood - Altered:  Goal: Mood stable  Description: Mood stable  Outcome: Met This Shift  Goal: Absence of abusive behavior  Description: Absence of abusive behavior  Outcome: Met This Shift  Goal: Verbalizations of feeling emotionally comfortable while being cared for will increase  Description: Verbalizations of feeling emotionally comfortable while being cared for will increase  Outcome: Met This Shift     Problem: Psychomotor Activity - Altered:  Goal: Absence of psychomotor disturbance signs and symptoms  Description: Absence of psychomotor disturbance signs and symptoms  Outcome: Met This Shift     Problem: Sensory Perception - Impaired:  Goal: Demonstrations of improved sensory functioning will increase  Description: Demonstrations of improved sensory functioning will increase  Outcome: Met This Shift  Goal: Decrease in sensory misperception frequency  Description: Decrease in sensory misperception frequency  Outcome: Met This Shift  Goal: Able to refrain from responding to false sensory perceptions  Description: Able to refrain from responding to false sensory perceptions  Outcome: Met This Shift  Goal: Demonstrates accurate environmental perceptions  Description: Demonstrates accurate environmental perceptions  Outcome: Met This Shift  Goal: Able to distinguish between reality-based and nonreality-based thinking  Description: Able to distinguish between reality-based and nonreality-based thinking  Outcome: Met This Shift  Goal: Able to interrupt nonreality-based thinking  Description: Able to interrupt nonreality-based thinking  Outcome: Met This Shift     Problem: Sleep Pattern Disturbance:  Goal: Appears well-rested  Description: Appears well-rested  Outcome: Met This Shift

## 2021-08-26 NOTE — CARE COORDINATION
8/26/21 1046 CM note: COVID (-) 8/24/21. Telesitter. Hospice consult noted. Met with patients daughter/HCPOA, Joe Almeida, at the bedside to discuss hospice options. Joe Almeida choiced for [facility]. Referral given to [facility] liaison. CM will continue to follow for discharge needs. Electronically signed by Brooke Wiggins RN on 8/26/2021 at 10:50 AM     8/26/21 7075 Update: [facility] made arrangements for patient at [facility] with  with PAS @ 90 minutes. KAROLINE Khan updated.  Electronically signed by Brooke Wiggins RN on 8/26/2021 at 2:51 PM

## 2021-08-26 NOTE — PROGRESS NOTES
Acute Problems:   Chronic hypoxic respiratory failure  Urosepsis  Altered mental status  Bilateral pleural effusions  Pulmonary edema  COPD  Metastatic prostate cancer    Assessment and Plan:  -C/W with IV fluid with close monitoring of respiratory status.  -Continue to evaluate for fluid overload  -Thoracentesis is not indicated at this time due to patient's lack of respiratory symptoms. Family is considering hospice.  -Hold off on steroids as patient does not appear to be in acute exacerbation of COPD  -Echo pending  -Cardiology consulted for possible CHF exacerbation  -Infectious disease following for urosepsis and managing antibiotics. History of Present Illness:   Patient is a 82 y.o. male with a past medical history of atrial fibrillation, metastatic prostate cancer with mets to the bone, COPD, former smoker, sick sinus syndrome with dual-chamber pacemaker who presented to the ED from his nursing home for hypotension and altered mental status. His daughter-in-law was at bedside upon my assessment and stated that he had been admitted to [facility] 3 weeks ago for multiple falls while on anticoagulation. At that time he was discharged to a SNF where he continued to have altered mental status. He was discharged on 3 L nasal cannula. Of note he did have COVID-19 in December 2020 however was treated and discharged home. Daughter-in-law states he has had continued decline since that discharge and has become weak and had multiple falls. Our service has been consulted secondary to chronic respiratory failure with possible COPD exacerbation and bilateral pleural effusions with pulmonary edema. Unfortunately this patient is chronically and acutely ill and his fluid status is very tenuous. He does have a urinary tract infection with an acute kidney injury superimposed on chronic kidney disease with hyperkalemia. He appears to be acutely dehydrated and will need IV fluids.   This could be from exacerbation of CHF and an echo has been ordered for tomorrow. He does have metastatic prostate cancer with lung nodules seen in the past.  I would not recommend thoracentesis at this time as patient is acutely ill and only requiring baseline oxygen. Upon my assessment the patient is confused grasping for things in the air and unable to communicate his needs. Daily progress:  08/25/2021: Patient remains lethargic and poorly responsive. He has extensive tremor. I had an extensive discussion with the patient's daughter at the bedside who is considering hospice care and comfort care measures. Patient is currently on low-flow nasal cannula and not in any respiratory distress. 08/26/2021: Remains lethargic and poorly responsive. His kidney function continues to worsen. Has been evaluated for hospice.       Past Medical History:  Past Medical History:   Diagnosis Date    Anxiety     Atrial fibrillation (HCC)     COPD (chronic obstructive pulmonary disease) (HCC)     GERD (gastroesophageal reflux disease)     High cholesterol     Prostate CA (HCC)         Family History:   Family History   Problem Relation Age of Onset    Cancer Mother     Cancer Father     Cancer Brother        Allergies:         Amoxicillin    Social history:  Social History     Socioeconomic History    Marital status:      Spouse name: Not on file    Number of children: Not on file    Years of education: Not on file    Highest education level: Not on file   Occupational History    Not on file   Tobacco Use    Smoking status: Former Smoker    Smokeless tobacco: Current User     Types: Chew   Vaping Use    Vaping Use: Never used   Substance and Sexual Activity    Alcohol use: Not Currently    Drug use: Never    Sexual activity: Not Currently   Other Topics Concern    Not on file   Social History Narrative    Not on file     Social Determinants of Health     Financial Resource Strain:     Difficulty of Paying Living Expenses:    Food Insecurity:     Worried About [facility] in the Last Year:     [facility] in the Last Year:    Transportation Needs:     Lack of Transportation (Medical):      Lack of Transportation (Non-Medical):    Physical Activity:     Days of Exercise per Week:     Minutes of Exercise per Session:    Stress:     Feeling of Stress :    Social Connections:     Frequency of Communication with Friends and Family:     Frequency of Social Gatherings with Friends and Family:     Attends Restoration[employer] Services:     Active Member of Clubs or Organizations:     Attends Club or Organization Meetings:     Marital Status:    Intimate Partner Violence:     Fear of Current or Ex-Partner:     Emotionally Abused:     Physically Abused:     Sexually Abused:        Current Medications:     Current Facility-Administered Medications:     calcium gluconate 4,000 mg in dextrose 5 % 250 mL IVPB, 4,000 mg, IntraVENous, Once, Adolfo Alexander MD, Last Rate: 62.5 mL/hr at 08/26/21 1105, 4,000 mg at 08/26/21 1105    acetaminophen (TYLENOL) tablet 650 mg, 650 mg, Oral, Q6H PRN **OR** acetaminophen (TYLENOL) suppository 650 mg, 650 mg, Rectal, Q4H PRN, Cathy Drain, DO, 650 mg at 08/26/21 0234    metoprolol succinate (TOPROL XL) extended release tablet 50 mg, 50 mg, Oral, Daily, Janis King, APRN - CNP    albuterol (PROVENTIL) nebulizer solution 2.5 mg, 2.5 mg, Nebulization, Q4H PRN, Izola Sollauro, APRN - CNP, 2.5 mg at 08/25/21 1615    Arformoterol Tartrate (BROVANA) nebulizer solution 15 mcg, 15 mcg, Nebulization, BID, Izola Solan, APRN - CNP, 15 mcg at 08/25/21 1615    sodium chloride flush 0.9 % injection 5-40 mL, 5-40 mL, IntraVENous, 2 times per day, Izola Solan, APRN - CNP, 10 mL at 08/26/21 1005    sodium chloride flush 0.9 % injection 5-40 mL, 5-40 mL, IntraVENous, PRN, Izola Solan, APRN - CNP    0.9 % sodium chloride infusion, 25 mL, IntraVENous, PRN, Izola Solan, APRN - CNP, Last Rate: 100 mL/hr at 08/25/21 1048, 25 mL at 08/25/21 1048    heparin (porcine) injection 5,000 Units, 5,000 Units, Subcutaneous, 3 times per day, Murlene Creed, APRN - CNP, 5,000 Units at 08/26/21 5008    haloperidol lactate (HALDOL) injection 2 mg, 2 mg, IntraMUSCular, Q6H PRN, Murlene Creed, APRN - CNP, 2 mg at 08/26/21 0241    perflutren lipid microspheres (DEFINITY) injection 1.65 mg, 1.5 mL, IntraVENous, ONCE PRN, Murlene Creed, APRN - CNP    sodium bicarbonate 75 mEq in dextrose 5 % 1,000 mL infusion, , IntraVENous, Continuous, Murlene Creed, APRN - CNP, Last Rate: 75 mL/hr at 08/26/21 0748, New Bag at 08/26/21 0748    lactobacillus (CULTURELLE) capsule 1 capsule, 1 capsule, Oral, Q12H, Zita Modi MD, 1 capsule at 08/25/21 2124    meropenem (MERREM) 500 mg IVPB (mini-bag), 500 mg, IntraVENous, Q12H, Zita Modi MD, Stopped at 08/26/21 1030    vancomycin (VANCOCIN) intermittent dosing (placeholder), , Other, RX Placeholder, Murlene Creed, APRN - CNP    Review of Systems:   Unable to perform review of systems secondary to altered mental status    Physical Exam:   Vital Signs:  BP (!) 153/110   Pulse 83   Temp 98.9 °F (37.2 °C) (Axillary)   Resp 22   Ht 6' 2\" (1.88 m)   Wt 157 lb (71.2 kg)   SpO2 93%   BMI 20.16 kg/m²     Input/Output: In: -   Out: 700     Oxygen requirements: 3LNC    Ventilator Information:  Vent Information  SpO2: 93 %    General appearance: Cachectic, in no respiratory distress    HEENT: normocephalic, ELIJAH, pharynx clear, dry mucosa, redness of the uvula appreciated,   Neck: Supple, no jugular venous distension, lymphadenopathy, thyromegaly or carotid bruits  Chest: Equal normal breath sounds, no wheezing, no crackles and no tenderness over ribs   Cardiovascular: Normal S1 , S2, no murmur, rub or gallop  Abdomen: Normal sounds present, soft, lax with no tenderness, no hepatosplenomegaly, and no masses  Extremities: No edema. Pulses are equally present.    Skin: intact, no rashes   Neurologic: Confused, hallucinating, PERRLA, +ve tremor     Investigations:  Labs, radiological imaging and cardiac work up were reviewed         Time: 32 minutes      Electronically signed by Joni Sheridan MD on 8/26/2021 at 12:26 PM

## 2021-08-26 NOTE — CONSULTS
[address]                                  CONSULTATION    PATIENT NAME: Shira Knowles                      :        1935  MED REC NO:   [id number]                            ROOM:       2703  ACCOUNT NO:   [de-identified]                           ADMIT DATE: 2021  PROVIDER:     Loras Nissen, MD    CONSULT DATE:  2021    HISTORY OF PRESENT ILLNESS:  The patient is an 82-year-old gentleman  known to me from before. He has problem with sick sinus syndrome and paroxysmal atrial  fibrillation. He did require permanent pacemaker in the recent past.    He used to be on Eliquis for paroxysmal atrial fibrillation. He was hospitalized at [facility] with altered mental status and  he was felt to have urinary tract infection. I saw him at that time and  I recall he had echo that showed normal left ventricular systolic  function. I need to review his records from [facility].    He was discharged after that. The patient was brought to the emergency room mainly with worsening  mental status. He was getting confused. He fell down a few times. Eliquis was apparently stopped prior to this hospitalization. As I  stated, the patient is confused and I could not get clear history from  him. The patient was admitted because of his altered mental status and  confusion. Cardiac consult was requested. The patient was lying flat. He was very lethargic. Sluggish responses  to verbal stimuli. PAST MEDICAL HISTORY:  As above plus history of advanced COPD. Prostate  cancer with metastasis. Hyperlipidemia. HABITS:  He quit smoking few years ago. He was never a heavy alcohol  drinker. REVIEW OF SYSTEMS:  I could not get anything from the patient. PHYSICAL EXAMINATION:  GENERAL:  The patient was lying flat. No acute respiratory distress.   VITAL SIGNS:  Blood pressure 105/86, heart rate around 98, temperature  99.1. It was 101 earlier. NECK:  No JVD. HEART:  Irregular S1 and S2. No S3.  1/6 systolic murmur heard best at  the left sternal border. LUNGS:  Diminished breath sounds in the bases. ABDOMEN:  Nondistended. EXTREMITIES:  No edema, cyanosis, or clubbing. NEUROLOGIC:  Sluggish responses to verbal stimuli. DIAGNOSTIC DATA:  EKG showed a lot of artifact. It looks like regular  rhythm with right bundle-branch block. LABORATORY DATA:  On admission, WBC 10.6, hemoglobin 8.3, platelet count  017. Sodium 144, potassium 5.9, BUN 32, creatinine 2.2. Lactic acid 5. Digoxin level 3.9. IMAGING DATA:  CT of the abdomen and pelvis showed diffuse metastases  from prostate cancer. Chest x-ray today showed some vascular congestion with small right  pleural effusion. IMPRESSION:  1. Confusion and altered mental status. This can be from multiple  possibilities. The patient has high fever and his altered mental status  can be from sepsis. 2.  Paroxysmal atrial fibrillation. Eliquis was held prior to admission  _____. Hemoglobin on admission was 8.3. The patient dropped his  hemoglobin this afternoon to 6.8 and he appears to have GI bleeding. I  recommend GI evaluation. 3.  Elevated troponin level. High-sensitivity troponin on admission was  77 and that can be from multiple factors. It can be demand ischemia  from fever and sepsis with type 2 non-STEMI. The patient was also found  to have acute renal failure with elevation of his creatinine and  potassium and that can cause troponin elevation. As I recall, the  patient had coronary workup done in the recent past in my office and I  need to review his record. The patient is not in a shape now for any coronary workup.         Radha Tong MD    D: 08/25/2021 17:13:08       T: 08/25/2021 17:20:06     [email or web address]  Job#: [id number]     Doc#: [id number]    CC:

## 2021-08-26 NOTE — PROGRESS NOTES
responses to verbal stimuli  EXTREMITIES: No edema cyanosis or clubbing. DATA:          Cardiology Labs:  BMP:    Lab Results   Component Value Date     08/26/2021    K 4.5 08/26/2021    K 5.9 08/24/2021     08/26/2021    CO2 22 08/26/2021    BUN 40 08/26/2021     CBC:    Lab Results   Component Value Date    WBC 7.4 08/26/2021    RBC 2.49 08/26/2021    HGB 7.2 08/26/2021    HCT 22.8 08/26/2021    MCV 91.6 08/26/2021    RDW 21.2 08/26/2021     08/26/2021     PT/INR:  No results found for: PTINR  TROPONIN:  No components found for: TROP    ASSESSMENT    1paroxysmal atrial fibrillation. Rhythm looks regular on EKG and monitor although patient has a lot of artifact on his EKG and monitor due to shaking and I could not see clear P waves. Off anticoagulation due to severe anemia. Echocardiogram showing preserved left ventricular systolic function with moderate to severe pulmonary hypertension. The echocardiogram findings are consistent with secondary pulmonary hypertension from severe COPD. 2elevated troponin level. This is mostly demand ischemia from fever and sepsis with type II non-STEMI. 3acute renal failure. Digoxin level is high and digoxin was stopped on admission. 4altered mental status. Patient is being evaluated for hospice. PLAN  As per orders.

## 2021-08-26 NOTE — PROGRESS NOTES
Date:2021  Patient Name: Lizz Cordero  MRN: [id number]  : 1935  ROOM #: 0513/0513-01    Physical Therapy order received, chart reviewed and evaluation attempted this date. Patient is unavailable for PT evaluation due to being a nurse hold because pt is being d/daphnie to hospice house. Will attempt PT evaluation at a later time. Thank you.    Tami Aguiar, PT, DPT

## 2021-08-26 NOTE — PROGRESS NOTES
[facility]  [address], [address]         Date:2021                                                   Patient Name: Lizz Cordero     MRN: [id number]     : 1935     Room: [address] [place] [address]-       Evaluating OT: PAPA Ingram/RUTHIE; [id number]       Referring Provider and Orders/Date:   OT eval and treat Start: 21 1400, End: 21 1400, ONE TIME, Standing Count: 1 Occurrences R    Joe Osborne, VERONA - MELANY       Diagnosis:   1. Sepsis, due to unspecified organism, unspecified whether acute organ dysfunction present ([facility]    2. Altered mental status, unspecified altered mental status type    3. CLINTON (acute kidney injury) ([facility]    4.  Hyperkalemia    5. UTI (urinary tract infection), bacterial         Pertinent Medical History:        Past Medical History:   Diagnosis Date    Anxiety     Atrial fibrillation (HCC)     COPD (chronic obstructive pulmonary disease) (HCC)     GERD (gastroesophageal reflux disease)     High cholesterol     Prostate [facility])           Past Surgical History:   Procedure Laterality Date    CATARACT REMOVAL WITH IMPLANT      both eyes    COLONOSCOPY      KNEE ARTHROSCOPY      right knee    PROSTATE BIOPSY      TONSILLECTOMY         Precautions:  Fall Risk, 4L, telesitterleandro    Recommended placement: subacute    Assessment of current deficits     [x] Functional mobility  [x]ADLs  [x] Strength               [x]Cognition     [x] Functional transfers   [x] IADLs         [x] Safety Awareness   [x]Endurance     [] Fine Coordination              [x] Balance      [] Vision/perception   []Sensation      [x]Gross Motor Coordination  [] ROM  [] Delirium                   [x] Motor Control     OT PLAN OF CARE   OT POC based on physician orders, patient diagnosis and results of clinical assessment    Frequency/Duration 1-3 days/wk for 2 weeks PRN   Specific OT Treatment Interventions to include:   * Instruction/training on adapted ADL techniques and AE recommendations to increase functional independence within precautions       * Training on energy conservation strategies, correct breathing pattern and techniques to improve independence/tolerance for self-care routine  * Functional transfer/mobility training/DME recommendations for increased independence, safety, and fall prevention  * Patient/Family education to increase follow through with safety techniques and functional independence  * Recommendation of environmental modifications for increased safety with functional transfers/mobility and ADLs  * Cognitive retraining/development of therapeutic activities to improve problem solving, judgement, memory, and attention for increased safety/participation in ADL/IADL tasks  * Therapeutic exercise to improve motor endurance, ROM, and functional strength for ADLs/functional transfers  * Therapeutic activities to facilitate/challenge dynamic balance, stand tolerance for increased safety and independence with ADLs  * Therapeutic activities to facilitate gross/fine motor skills for increased independence with ADLs  * Neuro-muscular re-education: facilitation of righting/equilibrium reactions, midline orientation, scapular stability/mobility, normalization of muscle tone, and facilitation of volitional active controled movement  * Positioning to improve skin integrity, interaction with environment and functional independence   Recommended Adaptive Equipment/DME:  TBD      Home Living: Pt was previously at SNF for short term rehab. Pt unable to report home set up.     Bathroom setup: unknown    DME owned: unknown     Prior Level of Function: unknown with ADLs , unknown with IADLs; ambulated unknown   Driving: unknow   Occupation: retired   Enjoys: not reported    Pain Level: none reported  Cognition: A&O: 0/4; Follows 1 step directions-Pt is a poor historian and unable to answer home set up and PLOF-difficult to arouse and even with eyes open was not communicating with therapist.    Memory:  poor   Sequencing:  poor   Problem solving:  poor   Judgement/safety:   poor    AM-PAC Daily Activity Inpatient   How much help for putting on and taking off regular lower body clothing?: Total  How much help for Bathing?: Total  How much help for Toileting?: Total  How much help for putting on and taking off regular upper body clothing?: Total  How much help for taking care of personal grooming?: Total  How much help for eating meals?: Total  AM-PAC Inpatient Daily Activity Raw Score: 6  AM-PAC Inpatient ADL T-Scale Score : 17.07  ADL Inpatient CMS 0-100% Score: 100  ADL Inpatient CMS G-Code Modifier : CN       Functional Assessment:     Initial Eval Status  Date: 8/26/2021   Treatment Status  Date: STGs = LTGs  Time frame: 10-14 days   Feeding Dependent with confusion noted to hold drinks. Mod A   Grooming Dependent to wash face and comb hair from supine  Minimal Assist    UB Dressing Dependent to don a clean gown from supine  Maximal Assist    LB Dressing Dependent to don robert socks. Maximal Assist    Bathing Dependent for all parts with Ax2 for rolling to clean buttocks with rolling. Moderate Assist    Toileting Dependent with incontinence with A x 2 for thoroughness and hygiene in side lying. Maximal Assist    Bed Mobility  Supine to sit: Maximal Assist with LB and trunk support. Pt was more alert during this time and was able to assist in scooting forward. Sit to supine: Dependent  With A x 2 to position pt up in bed following. Supine to sit: Moderate Assist   Sit to supine: Moderate Assist    Functional Transfers  NT due to pt overall debility, decreased activity tolerance, balance deficits, safety and fall risk. Moderate Assist    Functional Mobility NT due to pt overall debility, decreased activity tolerance, balance deficits, safety and fall risk.       Moderate Assist    Balance Sitting: Static:  Poor+    Dynamic:poor+  Standing: NT  Sitting:     Static:  fair    Dynamic:fair  Standing: poor+   Activity Tolerance Vitals with activity:on 4L with heavy breathing. Not tolerating pulse off and kept taking it off prior to reading. Poor tolerance sitting EOB with max A to maintain  Increase sitting tolerance for >10min for carry over into toileting, functional tranfers and indep in ADLs   Visual/  Perceptual Glasses: Present; WFL    Reports change in vision since admission: No     NA   Aly UE Strengthening  2-/5 generally  3+/5MMT generally for carry over into self care, functional transfers and functional mobility with AD. Hand Dominance  [x] Right  [] Left    AROM (PROM) Strength Additional Info:    RUE  PROM- WFL; AROM WFL with volitional movement only and unable to follow commands to complete ROM testing. 2-/5 Fair-  and FMC/dexterity noted during ADL tasks  Opposition [] Intact [x] Impaired  Finger to nose [] Intact [x] Impaired     LUE PROM- WFL;AROM WFL with volitional movement only and unable to follow commands to complete ROM testing. 1+/5 Poor  and FMC/dexterity noted during ADL tasks  Opposition [] Intact [x] Impaired  Finger to nose [] Intact [x] Impaired     Hearing: WFL   Sensation:  No c/o numbness or tingling   Tone: WFL  Edema: none    Comments: Upon arrival patient supine in bed and not alert or easy to arrise. Pt required dep A for most UB ADLs and dep A LB ADLs tasks. Limited with max-dep to sit EOB. Standing and transfers NT due to pt overall debility, decreased activity tolerance, balance deficits, safety and fall risk. The biggest barriers reflect that of functional transfers, functional mobility, UB/LB ADLs, cognition, activity tolerance, balance, safety and strengthening. At end of session, patient supine with call light and phone within reach, all lines and tubes intact. Telesitter in place.  Overall patient demonstrated decreased independence and safety during completion of ADL/functional transfer/mobility tasks. Nursing updated on pt position and status following OT eval. Pt would benefit from continued skilled OT to increase safety and independence with completion of ADL/IADL tasks for functional independence and quality of life. Treatment: OT treatment provided this date includes:   Instruction, education and training on safe facilitation and adapted techniques for completion of ADLs. These include neuromuscular reeducation to facilitate balance/righting reactions, proper positioning/alignment to improve interaction with environment and overall function and on energy conservation/work simplification for completion of ADLs. Education provided on hand/feet placement with bed rails and body mechanics for fall prevention. Cues for energy conservation and safety for in the home at [place] with poor understanding. Extended time to complete all tasks, including skilled monitoring of patient's response during treatment session and vital signs. Prior to and at the end of session, environmental modifications / line management completed for patients safety and efficiency of treatment session. See above for further details. Rehab Potential: Fair- for established goals     Patient / Family Goal: none reported      Patient and/or family were instructed on functional diagnosis, prognosis/goals and OT plan of care. Demonstrated poor understanding. Eval Complexity: Low  · History: Brief review of medical records and additional review of physical, cognitive, or psychosocial history related to current functional performance  · Exam: 3+ performance deficits  · Assistance/Modification: Mod assistance or modifications required to perform tasks. May have comorbidities that affect occupational performance.     Time In: 0800  Time Out: 0831  Total Treatment Time: 1111    Min Units   OT Eval Low 97165  x  1   OT Eval Medium 17540      OT Eval High 97058      OT Re-Eval [id number] Therapeutic Ex [id number]       Therapeutic Activities 17232  11 1    ADL/Self Care 37553       Orthotic Management 37121       Manual 72740     Neuro Re-Ed 55098       Non-Billable Time          Evaluation Time additionally includes thorough review of current medical information, gathering information on past medical history/social history and prior level of function, interpretation of standardized testing/informal observation of tasks, assessment of data and development of plan of care and goals.             Gustavo Casas OTR/L; [id number]

## 2021-08-26 NOTE — PROGRESS NOTES
mcg/mL      Plan:  · Hold dose today  · Check random tomorrow with morning labs and re-dose if random level less than 15-20 mcg/ml   · Follow renal function   · Pharmacist will follow and monitor/adjust dosing as necessary      Thank you for the consult,    Wyatt Stringer, Pharmacy Candidate 2022     I have reviewed the above information with the pharmacy student/resident. Any changes can made are noted in bold/italics and or .     Elder Huang, PharmD, BCPS 8/26/2021 3:10 PM   [phone]

## 2021-08-26 NOTE — PROGRESS NOTES
Liaison Informational Visit Note      Referral received from [facility]             Patient Name: Brian Trevino   :  1935  MRN:  [id number]    Admit date:  2021      Hospital Admitting Physician:  Nelson Call DO   PCP:  Rach Pascual MD    Primary Insurance: Payor: Ion Salinas /  /  /      Emergency Contact:      Contact/Relation:   /         Phone:         Advance Directive  Advance directives received No  Patient has a documented healthcare surrogate  Discussed with: Family member  DPOA-HC Name-Relation:    Phone:       Terminal Diagnosis Acute on chronic hypoxic respiratory failure due to COPD as confirmed by Dr. Cornelius Puckett, [address] Problem List:   Patient Active Problem List   Diagnosis Code    Severe sepsis with acute organ dysfunction ([facility] A41.9, R65.20       Code Status Order: DNR-CC    Allergies:  Amoxicillin    Family Goal: Comfort care    Meeting held with daughter Gisell Lujan and wife Cooper Bella    Referral received. Chart reviewed. Met with Gisell Lujan at the bedside. Cooper Bella arrived shortly after. Explained hospice philosophy and no longer pursuing any further aggressive treatment. Focusing on comfort care only. Explained hospice benefit and reviewed all levels of care including the hospice house for short term symptom management. Once symptoms are managed, patient would transfer to a routine level of care either home or ECF with hospice. Explained room and board would be private pay at the facility. Discussed roles and frequency visits of skilled nursing, personal care team and Marylou Xie and Cooper Bella would like to see if patient will qualify to the hospice house. Patient is minimally responsive. Respirations are 36 and labored. . 4LNC on continuously. Patient has continuous tremors. Patient has Haldol 2mg IM every six hours as needed. Patient received one dose today and twice yesterday. Patient had a bowel movement. During personal care, patient has a furrowed brow with facial grimacing. Skin hot to touch. Reeves draining dark sheree urine. Dressing to coccyx. Call placed to Darius Sevilla March, [facility] CNP. Reviewed chart and assessment. Patient has been accepted to the hospice house for respiratory distress and pain. Call placed to [facility] intake department. Spoke with NED Chavarria and received time of 4pm to room 115. Call placed to [facility] and will  in 90 minutes. Provided billing information. Call placed to the hospice house. Gave nurse to nurse report to Surendra Pressley. Updated daughter Marda Cowden and bedside Rn. Emotional support and active listening provided. Brochure given. Lucho Edwardsden will head to the hospice house to get patient settled in and sign consents. Charge nurse will get a discharge order. OK to leave IV site and reeves in place. Discharge Plan: Hospice house  Discharge Disposition;  Hospice house      Electronically signed by Evelia De Leon RN on 8/26/2021 at 2:47 PM

## 2021-08-26 NOTE — PROGRESS NOTES
Pharmacy Consultation Note  (Antibiotic Dosing and Monitoring)    Initial consult date: 2021   Consulting physician: SIENA Mcnamara  Drug(s): Vancomycin   Indication: Catheter acquired UTI    Ht Readings from Last 1 Encounters:   21 6' 2\" (1.88 m)     Wt Readings from Last 1 Encounters:   21 157 lb (71.2 kg)         Age/  Gender IBW DW  Allergy Information   82 y.o.     male 77.6 kg 67.7 kg  Amoxicillin                 Date  WBC BUN/CR UOP Drug/Dose Time   Given Level(s)   (Time) Comments     (#1) 10.6 32/2.2 -- Vancomycin 1000 mg IV x 1   (#2) -- 35/2.3 -- Vancomycin 1000 mg IV x 1 1644 11.0 mcg/mL @ 0500      (#3) 7.4 40/2.3 -- Hold Dose -- 18.9 mcg/mL @  0424      (#4)            (#5)            (#6)            (#7)            Estimated Creatinine Clearance: 25 mL/min (A) (based on SCr of 2.3 mg/dL (H)). UOP over the past 24 hours:       Intake/Output Summary (Last 24 hours) at 2021 1356  Last data filed at 2021 0511  Gross per 24 hour   Intake    Output 700 ml   Net -700 ml       Temp max: Temp (24hrs), Av °F (37.8 °C), Min:98.9 °F (37.2 °C), Max:101.1 °F (38.4 °C)      Antibiotic Regimen:  Antibiotic Dose Date Initiated   Ceftriaxone 1 g IV x 1    Meropenem 500 mg IV Q12H      Cultures:  available culture and sensitivity results were reviewed in EPIC  Cultures sent and are pending.   Culture Date Result    Blood cx # 1     Blood cx # 2     Urine cx  Klebsiella Aerogenes  Gram negative rods     Assessment:  · Consulted by VERONA Cagle CNP to dose/monitor vancomycin  · Goal trough level:  15-20 mcg/mL, AUC/ZEYAD: 400-600  · Pt is a 81 y/o male who presented  with altered mental status and severe sepsis with end organ dysfunction secondary to catheter acquired UTI  · Serum creatinine today: 2.3; CrCl ~ 24 mL/min; baseline Scr ~ 1.1 mg/dL  · : Random AM level = 11.0 mcg/mL  · : Random AM level = 18.9 mcg/mL      Plan:  · Hold dose today  · Check random tomorrow with morning labs and re-dose if random level less than 15-20 mcg/ml   · Follow renal function   · Pharmacist will follow and monitor/adjust dosing as necessary      Thank you for the consult,    Enmanuel Smith, Pharmacy Candidate 2022

## 2021-08-26 NOTE — PROGRESS NOTES
Nephrology Progress Note  [facility]     Reason for Consult: CLINTON  Requesting Physician:  Dr Jayant Crowe   Date of Service: 8/26/2021   Chief Complaint:  AMS  History Obtained From:  Medical record, nurse at bedside      History of Present Ilness:    FROM 8-25    Mr Ander Smiley is an 82year old male who we are consulted on for evaluation and management of acute kidney injury    He is seen and examined in his room. He is awake but confused and agitated - he does not answer questions or follow commands. He apparently presented to the hospital from [facility] with worsening confusion. He was recently at [facility] - will need to obtain records. He apparently had fall prior to presentation with prolonged immobilization. Other issues prior to presentation include hypotension and diarrhea - the details are not clear    He has had chronic kidney disease, though creatinine was less than 1.0 during recent [facility] admission. Creatinine was 2.2 on this admission. SUBJECTIVE  Seen and examined  Confused, lethargic. Agitated but less than on exam yesterday. Does not answer questions or follow commands.       Past Medical History:        Diagnosis Date    Anxiety     Atrial fibrillation (HCC)     COPD (chronic obstructive pulmonary disease) (HCC)     GERD (gastroesophageal reflux disease)     High cholesterol     Prostate CA (HCC)        Past Surgical History:        Procedure Laterality Date    CATARACT REMOVAL WITH IMPLANT      both eyes    COLONOSCOPY      KNEE ARTHROSCOPY      right knee    PROSTATE BIOPSY      TONSILLECTOMY         Current Medications:    Current Facility-Administered Medications: acetaminophen (TYLENOL) tablet 650 mg, 650 mg, Oral, Q6H PRN **OR** acetaminophen (TYLENOL) suppository 650 mg, 650 mg, Rectal, Q4H PRN  metoprolol succinate (TOPROL XL) extended release tablet 50 mg, 50 mg, Oral, Daily  albuterol (PROVENTIL) nebulizer solution 2.5 mg, 2.5 mg, Nebulization, Q4H PRN  Arformoterol Tartrate (BROVANA) nebulizer solution 15 mcg, 15 mcg, Nebulization, BID  sodium chloride flush 0.9 % injection 5-40 mL, 5-40 mL, IntraVENous, 2 times per day  sodium chloride flush 0.9 % injection 5-40 mL, 5-40 mL, IntraVENous, PRN  0.9 % sodium chloride infusion, 25 mL, IntraVENous, PRN  heparin (porcine) injection 5,000 Units, 5,000 Units, Subcutaneous, 3 times per day  haloperidol lactate (HALDOL) injection 2 mg, 2 mg, IntraMUSCular, Q6H PRN  perflutren lipid microspheres (DEFINITY) injection 1.65 mg, 1.5 mL, IntraVENous, ONCE PRN  sodium bicarbonate 75 mEq in dextrose 5 % 1,000 mL infusion, , IntraVENous, Continuous  lactobacillus (CULTURELLE) capsule 1 capsule, 1 capsule, Oral, Q12H  meropenem (MERREM) 500 mg IVPB (mini-bag), 500 mg, IntraVENous, Q12H  vancomycin (VANCOCIN) intermittent dosing (placeholder), , Other, RX Placeholder    Allergies:  Amoxicillin    Social History:    Unable to obtain due to medical condition     Family History:   Unable to obtain due to medical condition     Review of Systems:   Pertinent positives stated above in HPI. All other systems were reviewed and were negative.     Physical exam:   Constitutional:  VITALS:  BP (!) 153/110   Pulse 83   Temp 98.9 °F (37.2 °C) (Axillary)   Resp 22   Ht 6' 2\" (1.88 m)   Wt 157 lb (71.2 kg)   SpO2 93%   BMI 20.16 kg/m²     Gen: awake, no acute distress  Eyes: anicteric sclerae   HEENT: atraumatic/normocephalic, dry mucus membranes  Neck: supple, no JVD  Lungs: equal lung expansion, poor effort   CV: irregular   Abdomen: soft, nontender, nondistended, normoactive bowel sounds  Extremitiy: no clubbing, cyanosis  No edema  : +reeves   Skin: no rash, tugor wnl  Neuro: no focal deficits but confused and limited by medical condition   Psych: limited by medical condition       Data:       Last 3 CMP:    Recent Labs     08/24/21  1110 08/24/21  1110 08/24/21  2137 08/25/21  0500 08/26/21  0424      < > 146 142 145   K 5.9*  --  5.4* 5.0 4.5 *   < > 113* 110* 111*   CO2 19*   < > 17* 18* 22   BUN 32*   < > 33* 35* 40*   CREATININE 2.2*   < > 2.2* 2.3* 2.3*   GLUCOSE 93   < > 94 88 102*   CALCIUM 5.9*   < > 5.2* 5.3* 5.0*   PROT 6.0*  --   --  5.0* 4.6*   LABALBU 2.9*  --   --  2.2* 2.1*   BILITOT 0.5  --   --  0.4 0.5   ALKPHOS 582*  --   --  666* 730*   AST 69*  --   --  95* 65*   ALT 18  --   --  15 15    < > = values in this interval not displayed. Last 3 Glucose:     Recent Labs     08/24/21 2137 08/25/21  0500 08/26/21  0424   GLUCOSE 94 88 102*         Last 3 POC Glucose:     No results for input(s): POCGLU in the last 72 hours. Last 3 CK, CKMB, Troponin  Recent Labs     08/24/21 2137   CKTOTAL 516*         Last 3 CBC:  Recent Labs     08/24/21  1110 08/25/21  1352 08/26/21  0424   WBC 10.6 7.6 7.4   RBC 2.85* 2.37* 2.49*   HGB 8.3* 6.8* 7.2*   HCT 26.9*  26.7* 22.0* 22.8*   MCV 93.7 92.8 91.6   MCH 29.1 28.7 28.9   MCHC 31.1* 30.9* 31.6*   RDW 22.6* 22.9* 21.2*    152 139   MPV 10.4 10.9 10.3       Last 3 Hepatic Function Panel:    Recent Labs     08/24/21  1110 08/25/21  0500 08/26/21  0424   ALKPHOS 582* 666* 730*   ALT 18 15 15   AST 69* 95* 65*   PROT 6.0* 5.0* 4.6*   BILITOT 0.5 0.4 0.5   BILIDIR  --  <0.2 0.3   LABALBU 2.9* 2.2* 2.1*       Albumin:  Recent Labs     08/26/21  0424   LABALBU 2.1*       Calcium:  Recent Labs     08/26/21 0424   CALCIUM 5.0*       Ionized Calcium:  No results for input(s): IONCA in the last 72 hours. Magnesium:    Recent Labs     08/26/21 0424   MG 1.7         ABGs:  No results for input(s): PHART, PO2ART, HEZ7SQG, QMG3LTK, BEART, T9SMAQPR in the last 72 hours. Lactic Acid:  Recent Labs     08/26/21 0424   LACTA 2.2       Last 3 Amylase:  No results for input(s): AMYLASE in the last 72 hours. Last 3 Lipase:  Recent Labs     08/24/21  1110   LIPASE 28       Last 3 BNP:  No results for input(s): BNP in the last 72 hours.          US GALLBLADDER RUQ [[id number]] Collected: 08/24/21 1620     Order Status: Completed Updated: 08/24/21 1625     Narrative:       EXAMINATION:   RIGHT UPPER QUADRANT ULTRASOUND     8/24/2021 3:58 pm     COMPARISON:   None. HISTORY:   ORDERING SYSTEM PROVIDED HISTORY: r/o cholecystitis   TECHNOLOGIST PROVIDED HISTORY:   Portable please   Reason for exam:->r/o cholecystitis   What reading provider will be dictating this exam?->CRC     FINDINGS:   LIVER:  The liver demonstrates normal echogenicity without evidence of   intrahepatic biliary ductal dilatation. BILIARY SYSTEM:  Gallbladder sludge no gallbladder wall thickening or   pericholecystic fluid.  Negative sonographic Kaur's sign. Common bile duct is within normal limits measuring . RIGHT KIDNEY: The right kidney is atrophic measuring 8.9 cm without evidence   of hydronephrosis. PANCREAS:  Visualized portions of the pancreas are unremarkable. OTHER: No evidence of right upper quadrant ascites.  Right pleural effusion.      Impression:       Gallbladder sludge in an otherwise normal gallbladder.  Normal common bile   duct. Right pleural effusion.      XR HIP RIGHT (2-3 VIEWS) [[id number]] Collected: 08/24/21 1422     Order Status: Completed Updated: 08/24/21 1429     Narrative:       EXAMINATION:   TWO XRAY VIEWS OF THE RIGHT HIP     8/24/2021 12:47 pm     COMPARISON:   31 May 2019     HISTORY:   ORDERING SYSTEM PROVIDED HISTORY: right hip pain   TECHNOLOGIST PROVIDED HISTORY:   Reason for exam:->right hip pain     FINDINGS:   There is extensive sclerosis involving both iliac bones and both ischemia as   well as the right sacrum and L5 vertebra compatible with blastic osseous   metastatic disease.  There is severe osteoarthritis at the right hip.      Impression:       Extensive bony sclerosis compatible with blastic osseous metastatic disease.    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      XR CHEST 1 VIEW [[id number]] Collected: 08/24/21 1406     Order Status: Completed Updated: 08/24/21 1408     Narrative:       EXAMINATION:   ONE XRAY VIEW OF THE CHEST     8/24/2021 1:47 pm     COMPARISON:   None. HISTORY:   ORDERING SYSTEM PROVIDED HISTORY: shortness of breath   TECHNOLOGIST PROVIDED HISTORY:   Reason for exam:->shortness of breath     FINDINGS:   There is patchy opacification right lung which could represent a combination   of a pleural effusion and right lung airspace disease. There is a trace left pleural effusion     The heart is mildly enlarged.  There is a dual lead cardiac pacer on the left.      Impression:       1. Right lung airspace disease and right pleural effusion. 2. Trace left pleural effusion.      CT ABDOMEN PELVIS WO CONTRAST Additional Contrast? None [[id number]] Collected: 08/24/21 1347     Order Status: Completed Updated: 08/24/21 1358     Narrative:       EXAMINATION:   CT OF THE ABDOMEN AND PELVIS WITHOUT CONTRAST 8/24/2021 12:33 pm     TECHNIQUE:   CT of the abdomen and pelvis was performed without the administration of   intravenous contrast. Multiplanar reformatted images are provided for review. Dose modulation, iterative reconstruction, and/or weight based adjustment of   the mA/kV was utilized to reduce the radiation dose to as low as reasonably   achievable.      COMPARISON:   June 13, 2019     HISTORY:   ORDERING SYSTEM PROVIDED HISTORY: fall, trauma, abd ttp   TECHNOLOGIST PROVIDED HISTORY:   Reason for exam:->fall, trauma, abd ttp   Additional Contrast?->None   Decision Support Exception - unselect if not a suspected or confirmed   emergency medical condition->Emergency Medical Condition (MA)     FINDINGS:   No bowel obstruction, free air, or free fluid.  Moderate amount of stool   throughout the colon.  The appendix is visualized and normal in the right   lower quadrant.  Bernal catheter is present.  Surgical changes at level of the   prostate gland.  No retroperitoneal lymphadenopathy.  No intrahepatic or   extrahepatic bile duct dilatation.  Hyperattenuating material located in the   gallbladder.  No evidence of acute pancreatitis.  Spleen is nonenlarged. There is no hydronephrosis.  No renal or ureter calculus.  Bilateral inguinal   hernias measuring up to 2.9 cm on the right and 3.7 cm on the left.  The   hernias have increased in size compared to prior from June 13, 2019. Generalized sclerosis involving the osseous structures.  No evidence of   lumbar compression fracture.      Impression:       1. Diffuse osseous metastases. 2. No acute process in the abdomen or pelvis. 3. Hyperattenuating material located in the gallbladder could suggest   hyperdense sludge.      CT CHEST WO CONTRAST [[id number]] Collected: 08/24/21 1342     Order Status: Completed Updated: 08/24/21 1350     Narrative:       EXAMINATION:   CT OF THE CHEST WITHOUT CONTRAST 8/24/2021 12:33 pm     TECHNIQUE:   CT of the chest was performed without the administration of intravenous   contrast. Multiplanar reformatted images are provided for review. Dose   modulation, iterative reconstruction, and/or weight based adjustment of the   mA/kV was utilized to reduce the radiation dose to as low as reasonably   achievable. COMPARISON:   January 9, 2020     HISTORY:   ORDERING SYSTEM PROVIDED HISTORY: fall, trauma, hypoxia   TECHNOLOGIST PROVIDED HISTORY:   Reason for exam:->fall, trauma, hypoxia   Decision Support Exception - unselect if not a suspected or confirmed   emergency medical condition->Emergency Medical Condition (MA)     FINDINGS:   New moderate bilateral pleural effusions.  Smooth interlobular septal   thickening throughout both lungs.  Mild cardiomegaly.  No pericardial   effusion.  No abnormal mediastinal fluid collections.  No mediastinal or   hilar lymphadenopathy.  Left pacemaker is present.  No pneumothorax.  Diffuse   bony sclerosis demonstrated.      Impression:       1. New moderate to large bilateral pleural effusions.    2. Interstitial prominence throughout both lungs suggesting interstitial   pulmonary edema. 3. Diffuse osseous metastases.      CT Cervical Spine WO Contrast [[id number]] Collected: 08/24/21 1307     Order Status: Completed Updated: 08/24/21 1311     Narrative:       EXAMINATION:   CT OF THE CERVICAL SPINE WITHOUT CONTRAST 8/24/2021 12:33 pm     TECHNIQUE:   CT of the cervical spine was performed without the administration of   intravenous contrast. Multiplanar reformatted images are provided for review. Dose modulation, iterative reconstruction, and/or weight based adjustment of   the mA/kV was utilized to reduce the radiation dose to as low as reasonably   achievable. COMPARISON:   None. HISTORY:   ORDERING SYSTEM PROVIDED HISTORY: fall   TECHNOLOGIST PROVIDED HISTORY:   Reason for exam:->fall   Decision Support Exception - unselect if not a suspected or confirmed   emergency medical condition->Emergency Medical Condition (MA)     FINDINGS:   The ring of C1 is intact as is the dense.  There is no compression fracture   of the cervical spine.  No jumped or perched facet is noted. Multilevel degenerative disc and degenerative joint disease is noted. The prevertebral soft tissues are unremarkable.  The airway is widely patent. Images through the lung apices are negative for a pneumothorax.      Impression:       1. There is no acute compression fracture or subluxation of the cervical   spine.    2. Multilevel degenerative disc and degenerative joint disease.      CT Head WO Contrast [[id number]] Collected: 08/24/21 1256     Order Status: Completed Updated: 08/24/21 1300     Narrative:       EXAMINATION:   CT OF THE HEAD WITHOUT CONTRAST  8/24/2021 12:33 pm     TECHNIQUE:   CT of the head was performed without the administration of intravenous   contrast. Dose modulation, iterative reconstruction, and/or weight based   adjustment of the mA/kV was utilized to reduce the radiation dose to as low   as reasonably achievable. COMPARISON:   None. HISTORY:   ORDERING SYSTEM PROVIDED HISTORY: fall   TECHNOLOGIST PROVIDED HISTORY:   Reason for exam:->fall   Has a \"code stroke\" or \"stroke alert\" been called? ->No   Decision Support Exception - unselect if not a suspected or confirmed   emergency medical condition->Emergency Medical Condition (MA)     FINDINGS:   BRAIN/VENTRICLES: There is no acute intracranial hemorrhage, mass effect or   midline shift.  No abnormal extra-axial fluid collection.  The gray-white   differentiation is maintained without evidence of an acute infarct.  There is   no evidence of hydrocephalus. Periventricular white matter changes consistent   chronic microvascular disease.  Diffuse volume loss. ORBITS: The visualized portion of the orbits demonstrate no acute abnormality. SINUSES: The visualized paranasal sinuses and mastoid air cells demonstrate   no acute abnormality. SOFT TISSUES/SKULL:  No acute abnormality of the visualized skull or soft   tissues.      Impression:       No acute intracranial abnormality. Periventricular white matter changes consistent chronic microvascular   disease.  Diffuse volume loss.           Assessment/Plan      1 Acute kidney injury with CKD IIIA  Baseline creatinine 0.9 to 1.2 with risk factors including prior AKIs and age  Acute worsening with creatinine 2.2 on admission in the setting of decreased intake, hypotension, possible sepsis, reported diarrhea  Work-up has included CT scan with no hydronephrosis, UA with no protein, >20 WBCs, 2-5 RBCs; urine eosinophils of 2% - though in the setting of likely UTI and with no peripheral eosinophilia; CPK only mildly elevated at 516    At this time, will follow renal function and urine output closely  Give IV fluids  Maintain MAP>65mmHg - had been on midodrine PTA  Renal function stable this AM      2 Hyperkalemia  With CLINTON and K replacement PTA as well as digoxin toxicity  Received medical treatment Follow K and treat further as needed  K acceptable on last check     3 Metabolic acidosis  With renal failure and lactic acidosis  On bicarbonate gtt  Lactic acid improved    Acidosis improved but would continue bicarbonate gtt for now      4 Anemia  Hgb 8.3 on admission  No reported blood loss  With metastatic prostate cancer   As he is likely intravascularly volume depleted, his Hgb may decrease with volume resuscitation   Follow Hgb and transfuse as needed    5 Hypotension  With possible sepsis  Give IV fluids  On midodrine chronically  Blood pressure acceptable this AM    6 Hypophosphatemia  On replacement chronically  Follow PO4 and treat as needed  PO4 acceptable on last check     7 Hypocalcemia  With hypoalbuminemia   On calcitriol chronically  Check ionized calcium, PTH, vitamin D  Calcium lower despite replacement - will give additional calcium gluconate today       Prognosis appears poor.   Noted discussion of goals of care        Thank you for the opportunity to participate in the care of Mr Gurinder Barnes    ______________________________      Cameron Munguia MD  8/26/2021  9:25 AM

## 2021-08-26 NOTE — DISCHARGE INSTR - COC
(71.2 kg)   SpO2 93%   BMI 20.16 kg/m²     Last documented pain score (0-10 scale): Pain Level: 10  Last Weight:   Wt Readings from Last 1 Encounters:   08/26/21 157 lb (71.2 kg)     Mental Status:  {IP PT MENTAL STATUS:20030:::0}    IV Access:  [facility] DIANE IV ACCESS:267717383:::0}    Nursing Mobility/ADLs:  Walking   {CHP DME ADLs:437032762:::0}  Transfer  {CHP DME ADLs:303557808:::0}  Bathing  {CHP DME ADLs:332886451:::0}  Dressing  {CHP DME ADLs:188809190:::0}  Toileting  {CHP DME ADLs:923771110:::0}  Feeding  {CHP DME ADLs:479627088:::0}  Med Admin  {CHP DME ADLs:828113183:::0}  Med Delivery   { DIANE MED Delivery:321394946:::0}    Wound Care Documentation and Therapy:  Wound 08/25/21 Sacrum (Active)   Wound Etiology Deep tissue/Injury 08/26/21 0801   Dressing Status Clean;Dry; Intact 08/26/21 0801   Wound Cleansed Wound cleanser 08/26/21 0801   Dressing/Treatment Foam 08/26/21 0801   Wound Length (cm) 2 cm 08/25/21 0107   Wound Width (cm) 1.5 cm 08/25/21 0107   Wound Depth (cm) 0.5 cm 08/25/21 0107   Wound Surface Area (cm^2) 3 cm^2 08/25/21 0107   Wound Volume (cm^3) 1.5 cm^3 08/25/21 0107   Wound Assessment Dry 08/26/21 0801   Drainage Amount None 08/26/21 0801   Odor None 08/26/21 0801   Zahra-wound Assessment Fragile 08/26/21 0801   Number of days: 1       Wound 08/25/21 Coccyx (Active)   Wound Etiology Deep tissue/Injury 08/26/21 0801   Dressing Status Clean;Dry; Intact 08/26/21 0801   Wound Cleansed Wound cleanser 08/26/21 0801   Dressing/Treatment Foam 08/26/21 0801   Wound Length (cm) 1 cm 08/25/21 0107   Wound Width (cm) 0.5 cm 08/25/21 0107   Wound Depth (cm) 0.25 cm 08/25/21 0107   Wound Surface Area (cm^2) 0.5 cm^2 08/25/21 0107   Wound Volume (cm^3) 0.125 cm^3 08/25/21 0107   Wound Assessment Dry 08/26/21 0801   Number of days: 1        Elimination:  Continence:    Bowel: {YES / VM:13731}  Bladder: {YES / DU:19850}  Urinary Catheter: {Urinary Catheter:588933888:::0}   Colostomy/Ileostomy/Ileal Conduit: {YES / IC:77503}       Date of Last BM: ***    Intake/Output Summary (Last 24 hours) at 2021 1535  Last data filed at 2021 1402  Gross per 24 hour   Intake 0 ml   Output 550 ml   Net -550 ml     I/O last 3 completed shifts:   In: 0   Out: 800 [Urine:800]    Safety Concerns:     [address] Safety Concerns:054561604:::0}    Impairments/Disabilities:      [address] Impairments/Disabilities:271145445:::0}    Nutrition Therapy:  Current Nutrition Therapy:   [address] [facility] Diet List:722370329:::0}    Routes of Feeding: {P DME Other Feedings:945129562:::0}  Liquids: {Slp liquid thickness:49551}  Daily Fluid Restriction: {CHP DME Yes amt example:508555904:::0}  Last Modified Barium Swallow with Video (Video Swallowing Test): {Done Not Done MAU:::4}    Treatments at the Time of Hospital Discharge:   Respiratory Treatments: ***  Oxygen Therapy:  {Therapy; copd oxygen:31998:::0}  Ventilator:    {[facility] Vent List:695295286:::0}    Rehab Therapies: {THERAPEUTIC INTERVENTION:0364398649}  Weight Bearing Status/Restrictions: [facility] Birdie Morris  Weight Bearin:::0}  Other Medical Equipment (for information only, NOT a DME order):  {EQUIPMENT:639599489}  Other Treatments: ***    Patient's personal belongings (please select all that are sent with patient):  {CHP DME Belongings:021106553:::0}    RN SIGNATURE:  {Esignature:275301928:::0}    CASE MANAGEMENT/SOCIAL WORK SECTION    Inpatient Status Date: ***    Readmission Risk Assessment Score:  Readmission Risk              Risk of Unplanned Readmission:  24           Discharging to Facility/ Agency   Name:   Address:  Phone:  Fax:    Dialysis Facility (if applicable)   Name:  Address:  Dialysis Schedule:  Phone:  Fax:    / signature: {Esignature:932751289:::0}    PHYSICIAN SECTION    Prognosis: {Prognosis:4764700635:::0}    Condition at Discharge: [address] Patient Condition:780886700:::0}    Rehab Potential (if transferring to Rehab): {Prognosis:3969214281:::0}    Recommended Labs or Other Treatments After Discharge: ***    Physician Certification: I certify the above information and transfer of Raúl Nair  is necessary for the continuing treatment of the diagnosis listed and that he requires {Admit to Appropriate Level of Care:55944:::0} for {GREATER/LESS:856144268} 30 days.      Update Admission H&P: {CHP DME Changes in HandP:269451340:::0}    PHYSICIAN SIGNATURE:  Electronically signed by VERONA Maza CNP on 8/26/21 at 3:35 PM EDT

## 2021-08-26 NOTE — PLAN OF CARE
Problem: Falls - Risk of:  Goal: Will remain free from falls  Description: Will remain free from falls  8/26/2021 1523 by Carlos Somers RN  Outcome: Completed  8/26/2021 1523 by Carlos Somers RN  Outcome: Met This Shift  8/26/2021 1520 by Carlos Somers RN  Outcome: Met This Shift  Goal: Absence of physical injury  Description: Absence of physical injury  8/26/2021 1523 by Carlos Somers RN  Outcome: Completed  8/26/2021 1523 by Carlos Somers RN  Outcome: Met This Shift  8/26/2021 1520 by Carlos Somers RN  Outcome: Met This Shift     Problem: Skin Integrity:  Goal: Will show no infection signs and symptoms  Description: Will show no infection signs and symptoms  8/26/2021 1523 by Carlos Somers RN  Outcome: Completed  8/26/2021 1523 by Carlos Somers RN  Outcome: Met This Shift  8/26/2021 1520 by Carlos Somers RN  Outcome: Met This Shift  Goal: Absence of new skin breakdown  Description: Absence of new skin breakdown  8/26/2021 1523 by Carlos Somers RN  Outcome: Completed  8/26/2021 1523 by Carlos Somers RN  Outcome: Met This Shift  8/26/2021 1520 by Carlos Somers RN  Outcome: Met This Shift     Problem: Confusion - Acute:  Goal: Absence of continued neurological deterioration signs and symptoms  Description: Absence of continued neurological deterioration signs and symptoms  8/26/2021 1523 by Carlos Somers RN  Outcome: Completed  8/26/2021 1523 by Carlos Somers RN  Outcome: Met This Shift  8/26/2021 1520 by Carlos Somers RN  Outcome: Met This Shift  Goal: Mental status will be restored to baseline  Description: Mental status will be restored to baseline  8/26/2021 1523 by Carlos Somers RN  Outcome: Completed  8/26/2021 1523 by Carlos Somers RN  Outcome: Met This Shift  8/26/2021 1520 by Carlos Somers RN  Outcome: Met This Shift     Problem: Discharge Planning:  Goal: Ability to perform activities of daily living will improve  Description: Ability to perform activities of daily living will improve  8/26/2021 1523 by Giovani Guardado RN  Outcome: Completed  8/26/2021 1523 by Giovani Guardado RN  Outcome: Met This Shift  8/26/2021 1520 by Giovani Guardado RN  Outcome: Met This Shift  Goal: Participates in care planning  Description: Participates in care planning  8/26/2021 1523 by Giovani Guardado RN  Outcome: Completed  8/26/2021 1523 by Giovani Guardado RN  Outcome: Met This Shift  8/26/2021 1520 by Giovani Guardado RN  Outcome: Met This Shift     Problem: Injury - Risk of, Physical Injury:  Goal: Will remain free from falls  Description: Will remain free from falls  8/26/2021 1523 by Giovani Guardado RN  Outcome: Completed  8/26/2021 1523 by Giovani Guardado RN  Outcome: Met This Shift  8/26/2021 1520 by Giovani Guardado RN  Outcome: Met This Shift  Goal: Absence of physical injury  Description: Absence of physical injury  8/26/2021 1523 by Giovani Guardado RN  Outcome: Completed  8/26/2021 1523 by Giovani Guardado RN  Outcome: Met This Shift  8/26/2021 1520 by Giovani Guardado RN  Outcome: Met This Shift     Problem: Mood - Altered:  Goal: Mood stable  Description: Mood stable  8/26/2021 1523 by Giovani Guardado RN  Outcome: Completed  8/26/2021 1523 by Giovani Guardado RN  Outcome: Met This Shift  8/26/2021 1520 by Giovani Guardado RN  Outcome: Met This Shift  Goal: Absence of abusive behavior  Description: Absence of abusive behavior  8/26/2021 1523 by Giovani Guardado RN  Outcome: Completed  8/26/2021 1523 by Giovani Guardado RN  Outcome: Met This Shift  8/26/2021 1520 by Giovani Guardado RN  Outcome: Met This Shift  Goal: Verbalizations of feeling emotionally comfortable while being cared for will increase  Description: Verbalizations of feeling emotionally comfortable while being cared for will increase  8/26/2021 1523 by Giovani Guardado RN  Outcome: Completed  8/26/2021 1523 by Giovani Guardado RN  Outcome: Met This Shift  8/26/2021 1520 by Emmanuel Taylor RN  Outcome: Met This Shift     Problem: Psychomotor Activity - Altered:  Goal: Absence of psychomotor disturbance signs and symptoms  Description: Absence of psychomotor disturbance signs and symptoms  8/26/2021 1523 by Emmanuel Taylor RN  Outcome: Completed  8/26/2021 1523 by Emmanuel Taylor RN  Outcome: Met This Shift  8/26/2021 1520 by Emmanuel Taylor RN  Outcome: Met This Shift     Problem: Sensory Perception - Impaired:  Goal: Demonstrations of improved sensory functioning will increase  Description: Demonstrations of improved sensory functioning will increase  8/26/2021 1523 by Emmanuel Taylor RN  Outcome: Completed  8/26/2021 1523 by Emmanuel Taylor RN  Outcome: Met This Shift  8/26/2021 1520 by Emmanuel Taylor RN  Outcome: Met This Shift  Goal: Decrease in sensory misperception frequency  Description: Decrease in sensory misperception frequency  8/26/2021 1523 by Emmanuel Taylor RN  Outcome: Completed  8/26/2021 1523 by Emmanuel Taylor RN  Outcome: Met This Shift  8/26/2021 1520 by Emmanuel Taylor RN  Outcome: Met This Shift  Goal: Able to refrain from responding to false sensory perceptions  Description: Able to refrain from responding to false sensory perceptions  8/26/2021 1523 by Emmanuel Taylor RN  Outcome: Completed  8/26/2021 1523 by Emmanuel Taylor RN  Outcome: Met This Shift  8/26/2021 1520 by Emmanuel Taylor RN  Outcome: Met This Shift  Goal: Demonstrates accurate environmental perceptions  Description: Demonstrates accurate environmental perceptions  8/26/2021 1523 by Emmanuel Taylor RN  Outcome: Completed  8/26/2021 1523 by Emmanuel Taylor RN  Outcome: Met This Shift  8/26/2021 1520 by Emmanuel Taylor RN  Outcome: Met This Shift  Goal: Able to distinguish between reality-based and nonreality-based thinking  Description: Able to distinguish between reality-based and nonreality-based thinking  8/26/2021 1523 by Belinda Juli Bacon RN  Outcome: Completed  8/26/2021 1523 by Tangela Estevez RN  Outcome: Met This Shift  8/26/2021 1520 by Tangela Estevez RN  Outcome: Met This Shift  Goal: Able to interrupt nonreality-based thinking  Description: Able to interrupt nonreality-based thinking  8/26/2021 1523 by Tangela Estevez, RN  Outcome: Completed  8/26/2021 1523 by Tangela Estevez RN  Outcome: Met This Shift  8/26/2021 1520 by Tangela Estevez RN  Outcome: Met This Shift     Problem: Sleep Pattern Disturbance:  Goal: Appears well-rested  Description: Appears well-rested  8/26/2021 1523 by Tangela Estevez RN  Outcome: Completed  8/26/2021 1523 by Tangela Estevez RN  Outcome: Met This Shift  8/26/2021 1520 by Tangela Estevez RN  Outcome: Met This Shift

## 2021-08-27 NOTE — DISCHARGE SUMMARY
[address]                               DISCHARGE SUMMARY    PATIENT NAME: Karin Perales                      :        1935  MED REC NO:   [id number]                            ROOM:       4659  ACCOUNT NO:   [de-identified]                           ADMIT DATE: 2021  PROVIDER:     Willie Fuentes DO                  DISCHARGE DATE:  2021    ADMITTING DIAGNOSES:  Severe sepsis with associated end-organ  dysfunction secondary to catheter acquired urinary tract infection,  acute renal failure superimposed upon chronic kidney disease stage III  in the setting of poor intake and dehydration. SECONDARY DISCHARGE DIAGNOSES:  Non-ST-elevated myocardial infarction  with finding of pulmonary edema and pleural effusion concerning for  cardiomyopathy development, cardiac dysrhythmia and atrial fibrillation,  variable ventricular response chronically anticoagulant Eliquis, acute  on chronic anemia, hypokalemia resolved, hypocalcemia refractory to  supplementation, supratherapeutic level of Lanoxin, chronic obstructive  pulmonary disease with moderate acute exacerbation, metastatic prostate  cancer with evidence of bony metastasis, metabolic acidosis with anion  gap elevation, sick sinus syndrome with dual pacemaker in 2020,  sludge filled gallbladder, severe protein calorie malnutrition, recent  C. diff infection. COMPLICATIONS:  Multiple system organ failure. OPERATIONS:  No operation. CONSULTATION WAS OBTAINED:  With Dr. Laura Hancock, palliative care. Nephrology,   Critical care, Dr. Tari Luna,  infectious disease. ADMITTING PHYSICIANS:  Dr. Kita Quintanilla and Dr. Ame Copeland:  This is a pleasant  61-year-old white male who was admitted to [address] [place] [address]. The  patient presented to the emergency room for what appeared to be altered  mental status.   The patient was recently hospitalized at [facility] where he was initially quite lucid. He got  progressively worse agitation and confusion about 5 days or so. He  presented to the hospital after discharge with multiple signs of organ  involvement. He was admitted under the service of Dr. Eliud Croft and Dr. Margo Kline. The patient was seen at this time. For the review of systems,  please refer to consult. PAST MEDICAL HISTORY:  Positive for usual childhood diseases, history of  anxiety. Cardiac dysrhythmia, atrial fibrillation, COPD,  gastroesophageal reflux disease, high cholesterol, and prostate cancer. PHYSICAL EXAMINATION:  VITAL SIGNS:  Showed blood pressure 109/86, pulse 87, respirations  _____, O2 sat 100%. GENERAL APPEARANCE:  Revealed an 82year-old lethargic, agitated,  acutely ill male. HEENT:  Normal.  LUNGS:  Diminished. HEART:  Irregular but bradycardiac. ABDOMEN:  Soft. EXTREMITIES:  No edema. NEUROLOGIC:  The patient was lethargic. While the patient was in the hospital, he had the following laboratory  studies performed:  Hemoglobin and hematocrit was 8.3 and 26.7  respectively, white count 10,000, platelet count adequate. BUN and  creatinine was 32 and 2.2 respectively. HOSPITAL COURSE OF STAY:  The patient was admitted directly to the  hospital to the intensive coronary care unit. The patient was admitted  under the service of Dr. Eliud Croft and Dr. Margo Kline. The patient initially  presented here with what appeared to be underlying sepsis. Multidisciplinary approach at this time was employed at this time along  with treatment of the critical care specialist, at this time infectious  disease, nephrology, and cardiology. Over the next several days,  improvement was noted laboratory wise, but clinically the patient not  improved that much, evidence of multiple system organ failure was still  present. Palliative Care did see the patient because of underlying  cancer.   I did have discussion with them on 08/26 at which time the  daughter Jennifer Harris, who was the power of  and other family member  requested the hospice involvement at this time. Hospice became  involved, and the patient was transferred to [facility] on 08/26. At  time of discharge, medications at this time was stopped. Comfort care  was provided at this time. The patient at time of discharge was stable. His blood pressure 153/110, pulse 83, respiratory rate 22, O2 sat was  93%, height 6 feet 2 inches, weight 157. At time of discharge, the  patient was stable. Comfort care will be rendered at this time. His  overall prognosis at this time is poor. Death would not be unexpected  in the next several days. He is being transferred to [facility]. More than 40 minutes were spent on the day of discharge with more than  50% of the time spent face-to-face with the patient and family members  at this time discussing plan of care and treatment at this time.         Justen Ray DO    D: 08/26/2021 16:33:46       T: 08/26/2021 16:37:36     [email or web address]  Job#: [id number]     Doc#: [id number]    CC:

## 2021-08-29 LAB
BLOOD CULTURE, ROUTINE: NORMAL
CULTURE, BLOOD 2: NORMAL